# Patient Record
Sex: MALE | Race: OTHER | Employment: UNEMPLOYED | URBAN - METROPOLITAN AREA
[De-identification: names, ages, dates, MRNs, and addresses within clinical notes are randomized per-mention and may not be internally consistent; named-entity substitution may affect disease eponyms.]

---

## 2020-01-30 ENCOUNTER — APPOINTMENT (OUTPATIENT)
Dept: VASCULAR SURGERY | Age: 40
End: 2020-01-30
Attending: EMERGENCY MEDICINE
Payer: SELF-PAY

## 2020-01-30 ENCOUNTER — APPOINTMENT (OUTPATIENT)
Dept: GENERAL RADIOLOGY | Age: 40
End: 2020-01-30
Attending: EMERGENCY MEDICINE
Payer: SELF-PAY

## 2020-01-30 ENCOUNTER — APPOINTMENT (OUTPATIENT)
Dept: CT IMAGING | Age: 40
End: 2020-01-30
Attending: EMERGENCY MEDICINE
Payer: SELF-PAY

## 2020-01-30 ENCOUNTER — HOSPITAL ENCOUNTER (EMERGENCY)
Age: 40
Discharge: HOME | End: 2020-01-30
Attending: EMERGENCY MEDICINE
Payer: SELF-PAY

## 2020-01-30 VITALS
RESPIRATION RATE: 17 BRPM | SYSTOLIC BLOOD PRESSURE: 177 MMHG | HEART RATE: 76 BPM | DIASTOLIC BLOOD PRESSURE: 97 MMHG | TEMPERATURE: 98.9 F | OXYGEN SATURATION: 90 % | HEIGHT: 72 IN

## 2020-01-30 DIAGNOSIS — J11.1 INFLUENZA-LIKE ILLNESS: Primary | ICD-10-CM

## 2020-01-30 DIAGNOSIS — J45.901 REACTIVE AIRWAY DISEASE WITH ACUTE EXACERBATION, UNSPECIFIED ASTHMA SEVERITY, UNSPECIFIED WHETHER PERSISTENT: ICD-10-CM

## 2020-01-30 LAB
ALBUMIN SERPL-MCNC: 3.6 G/DL (ref 3.5–5)
ALBUMIN/GLOB SERPL: 0.8 {RATIO} (ref 1.1–2.2)
ALP SERPL-CCNC: 72 U/L (ref 45–117)
ALT SERPL-CCNC: 31 U/L (ref 12–78)
ANION GAP SERPL CALC-SCNC: 4 MMOL/L (ref 5–15)
AST SERPL-CCNC: 26 U/L (ref 15–37)
ATRIAL RATE: 94 BPM
BASOPHILS # BLD: 0 K/UL (ref 0–0.1)
BASOPHILS NFR BLD: 0 % (ref 0–1)
BILIRUB SERPL-MCNC: 0.5 MG/DL (ref 0.2–1)
BNP SERPL-MCNC: 173 PG/ML
BUN SERPL-MCNC: 11 MG/DL (ref 6–20)
BUN/CREAT SERPL: 16 (ref 12–20)
CALCIUM SERPL-MCNC: 8.9 MG/DL (ref 8.5–10.1)
CALCULATED P AXIS, ECG09: 29 DEGREES
CALCULATED R AXIS, ECG10: 68 DEGREES
CALCULATED T AXIS, ECG11: 26 DEGREES
CHLORIDE SERPL-SCNC: 102 MMOL/L (ref 97–108)
CO2 SERPL-SCNC: 33 MMOL/L (ref 21–32)
COMMENT, HOLDF: NORMAL
CREAT SERPL-MCNC: 0.67 MG/DL (ref 0.7–1.3)
DIAGNOSIS, 93000: NORMAL
DIFFERENTIAL METHOD BLD: ABNORMAL
EOSINOPHIL # BLD: 0.3 K/UL (ref 0–0.4)
EOSINOPHIL NFR BLD: 3 % (ref 0–7)
ERYTHROCYTE [DISTWIDTH] IN BLOOD BY AUTOMATED COUNT: 19.7 % (ref 11.5–14.5)
FLUAV AG NPH QL IA: NEGATIVE
FLUBV AG NOSE QL IA: NEGATIVE
GLOBULIN SER CALC-MCNC: 4.5 G/DL (ref 2–4)
GLUCOSE SERPL-MCNC: 125 MG/DL (ref 65–100)
HCT VFR BLD AUTO: 42.8 % (ref 36.6–50.3)
HGB BLD-MCNC: 13.4 G/DL (ref 12.1–17)
IMM GRANULOCYTES # BLD AUTO: 0 K/UL (ref 0–0.04)
IMM GRANULOCYTES NFR BLD AUTO: 0 % (ref 0–0.5)
LACTATE SERPL-SCNC: 1 MMOL/L (ref 0.4–2)
LYMPHOCYTES # BLD: 2.2 K/UL (ref 0.8–3.5)
LYMPHOCYTES NFR BLD: 24 % (ref 12–49)
MCH RBC QN AUTO: 27.7 PG (ref 26–34)
MCHC RBC AUTO-ENTMCNC: 31.3 G/DL (ref 30–36.5)
MCV RBC AUTO: 88.6 FL (ref 80–99)
MONOCYTES # BLD: 0.8 K/UL (ref 0–1)
MONOCYTES NFR BLD: 9 % (ref 5–13)
NEUTS SEG # BLD: 5.9 K/UL (ref 1.8–8)
NEUTS SEG NFR BLD: 64 % (ref 32–75)
NRBC # BLD: 0 K/UL (ref 0–0.01)
NRBC BLD-RTO: 0 PER 100 WBC
P-R INTERVAL, ECG05: 150 MS
PLATELET # BLD AUTO: 328 K/UL (ref 150–400)
PMV BLD AUTO: 10 FL (ref 8.9–12.9)
POTASSIUM SERPL-SCNC: 3.6 MMOL/L (ref 3.5–5.1)
PROT SERPL-MCNC: 8.1 G/DL (ref 6.4–8.2)
Q-T INTERVAL, ECG07: 362 MS
QRS DURATION, ECG06: 96 MS
QTC CALCULATION (BEZET), ECG08: 452 MS
RBC # BLD AUTO: 4.83 M/UL (ref 4.1–5.7)
SAMPLES BEING HELD,HOLD: NORMAL
SODIUM SERPL-SCNC: 139 MMOL/L (ref 136–145)
TROPONIN I SERPL-MCNC: <0.05 NG/ML
TROPONIN I SERPL-MCNC: <0.05 NG/ML
VENTRICULAR RATE, ECG03: 94 BPM
WBC # BLD AUTO: 9.3 K/UL (ref 4.1–11.1)

## 2020-01-30 PROCEDURE — 71275 CT ANGIOGRAPHY CHEST: CPT

## 2020-01-30 PROCEDURE — 87040 BLOOD CULTURE FOR BACTERIA: CPT

## 2020-01-30 PROCEDURE — 77030013140 HC MSK NEB VYRM -A

## 2020-01-30 PROCEDURE — 74011250637 HC RX REV CODE- 250/637: Performed by: EMERGENCY MEDICINE

## 2020-01-30 PROCEDURE — 99285 EMERGENCY DEPT VISIT HI MDM: CPT

## 2020-01-30 PROCEDURE — 80053 COMPREHEN METABOLIC PANEL: CPT

## 2020-01-30 PROCEDURE — 36415 COLL VENOUS BLD VENIPUNCTURE: CPT

## 2020-01-30 PROCEDURE — 83880 ASSAY OF NATRIURETIC PEPTIDE: CPT

## 2020-01-30 PROCEDURE — 74011636320 HC RX REV CODE- 636/320: Performed by: RADIOLOGY

## 2020-01-30 PROCEDURE — 84484 ASSAY OF TROPONIN QUANT: CPT

## 2020-01-30 PROCEDURE — 93005 ELECTROCARDIOGRAM TRACING: CPT

## 2020-01-30 PROCEDURE — 94640 AIRWAY INHALATION TREATMENT: CPT

## 2020-01-30 PROCEDURE — 83605 ASSAY OF LACTIC ACID: CPT

## 2020-01-30 PROCEDURE — 93970 EXTREMITY STUDY: CPT

## 2020-01-30 PROCEDURE — 85025 COMPLETE CBC W/AUTO DIFF WBC: CPT

## 2020-01-30 PROCEDURE — 74011000250 HC RX REV CODE- 250: Performed by: EMERGENCY MEDICINE

## 2020-01-30 PROCEDURE — 87804 INFLUENZA ASSAY W/OPTIC: CPT

## 2020-01-30 RX ORDER — METFORMIN HYDROCHLORIDE 500 MG/1
500 TABLET ORAL 2 TIMES DAILY WITH MEALS
COMMUNITY
End: 2020-05-12 | Stop reason: SDUPTHER

## 2020-01-30 RX ORDER — AMLODIPINE BESYLATE 5 MG/1
5 TABLET ORAL DAILY
COMMUNITY
End: 2020-08-19 | Stop reason: SDUPTHER

## 2020-01-30 RX ORDER — IBUPROFEN 600 MG/1
600 TABLET ORAL
Status: COMPLETED | OUTPATIENT
Start: 2020-01-30 | End: 2020-01-30

## 2020-01-30 RX ORDER — ALBUTEROL SULFATE 90 UG/1
2 AEROSOL, METERED RESPIRATORY (INHALATION)
Qty: 1 INHALER | Refills: 0 | Status: SHIPPED | OUTPATIENT
Start: 2020-01-30 | End: 2020-05-12 | Stop reason: SDUPTHER

## 2020-01-30 RX ORDER — FUROSEMIDE 20 MG/1
20 TABLET ORAL DAILY
COMMUNITY
End: 2020-03-25 | Stop reason: SDUPTHER

## 2020-01-30 RX ADMIN — IOPAMIDOL 80 ML: 755 INJECTION, SOLUTION INTRAVENOUS at 16:47

## 2020-01-30 RX ADMIN — IBUPROFEN 600 MG: 600 TABLET, FILM COATED ORAL at 16:17

## 2020-01-30 RX ADMIN — ALBUTEROL SULFATE 1 DOSE: 2.5 SOLUTION RESPIRATORY (INHALATION) at 14:37

## 2020-01-30 NOTE — ED PROVIDER NOTES
History is provided by the patient through a family friend who is the . He presents with 4 days of body aches, nonproductive cough, chest pain and feeling hot and cold. He also has some pain behind the left knee and the calf. He most recently was admitted for 40 days at a hospital in [place]. He was kept in ICU. He was told that he has a diagnosis of pneumonia. He was discharged on January 8, 2020 2 days ago. On January 10, he traveled to [place]. He will be settling here permanently. He has 3 pill bottles with him: Amlodipine, Lasix, and metformin. He checked his blood sugar this morning it was 130. He has no primary care physician here. He has no paperwork from his hospitalization in [place]. He has midsternal chest pain that is 5/10. He is prescribed no nebulizers or inhalers. 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 Had open tib/fib fx with hardware placed. Neg abd or chest surgical hx.       nonsmoker    No past medical history on file. Dm, pna, htn    No past surgical history on file. No family history on file.     Social History     Socioeconomic History    Marital status: Not on file     Spouse name: Not on file    Number of children: Not on file    Years of education: Not on file    Highest education level: Not on file   Occupational History    Not on file   Social Needs    Financial resource strain: Not on file    Food insecurity:     Worry: Not on file     Inability: Not on file    Transportation needs:     Medical: Not on file     Non-medical: Not on file   Tobacco Use    Smoking status: Not on file   Substance and Sexual Activity    Alcohol use: Not on file    Drug use: Not on file    Sexual activity: Not on file   Lifestyle    Physical activity:     Days per week: Not on file     Minutes per session: Not on file    Stress: Not on file   Relationships    Social connections:     Talks on phone: Not on file     Gets together: Not on file     Attends Congregation service: Not on file     Active member of club or organization: Not on file     Attends meetings of clubs or organizations: Not on file     Relationship status: Not on file    Intimate partner violence:     Fear of current or ex partner: Not on file     Emotionally abused: Not on file     Physically abused: Not on file     Forced sexual activity: Not on file   Other Topics Concern    Not on file   Social History Narrative    Not on file         ALLERGIES: Patient has no known allergies. Review of Systems   All other systems reviewed and are negative. Vitals:    01/30/20 1406   BP: (!) 153/94   Pulse: 100   Resp: 19   Temp: 98.9 °F (37.2 °C)   SpO2: 93%   Height: 6' (1.829 m)            Physical Exam  Vitals signs and nursing note reviewed. Constitutional:       General: He is not in acute distress. Appearance: Normal appearance. He is diaphoretic (slight). HENT:      Head: Normocephalic and atraumatic. Nose: Nose normal.      Mouth/Throat:      Mouth: Mucous membranes are moist.      Pharynx: Oropharynx is clear. Eyes:      Pupils: Pupils are equal, round, and reactive to light. Cardiovascular:      Rate and Rhythm: Normal rate and regular rhythm. Pulses: Normal pulses. Pulmonary:      Comments: Slightly labored breathing. Scattered wheeze L base. Abdominal:      Palpations: Abdomen is soft. Tenderness: There is no abdominal tenderness. Musculoskeletal:         General: Swelling (trace bilat) present. Comments: Tender L calf, popliteal area    Skin:     General: Skin is warm. Neurological:      General: No focal deficit present. Mental Status: He is alert. Psychiatric:         Mood and Affect: Mood normal.          MDM       Procedures    EKG reviewed by me: Normal sinus rhythm, rate of 94, regular. Normal axis and intervals. No acute ST-T wave changes noted.  Camila Buerger, DO      2:32 PM - will get US of legs, cxr, neb, blood. Will try to obtain records from [address].    3:24 PM - pain a little better. Breathing better after neb x 1. EKG #2 interpreted by me: Normal sinus rhythm, regular, rate of 85. Normal axis and intervals. No acute ST-T wave changes noted. Ken Vinson,         Recent Results (from the past 12 hour(s))   EKG, 12 LEAD, INITIAL    Collection Time: 01/30/20  2:13 PM   Result Value Ref Range    Ventricular Rate 94 BPM    Atrial Rate 94 BPM    P-R Interval 150 ms    QRS Duration 96 ms    Q-T Interval 362 ms    QTC Calculation (Bezet) 452 ms    Calculated P Axis 29 degrees    Calculated R Axis 68 degrees    Calculated T Axis 26 degrees    Diagnosis       Normal sinus rhythm  Normal ECG  No previous ECGs available  Confirmed by Zoya Montemayor MD. ([id number]) on 1/30/2020 5:33:57 PM     CBC WITH AUTOMATED DIFF    Collection Time: 01/30/20  2:15 PM   Result Value Ref Range    WBC 9.3 4.1 - 11.1 K/uL    RBC 4.83 4.10 - 5.70 M/uL    HGB 13.4 12.1 - 17.0 g/dL    HCT 42.8 36.6 - 50.3 %    MCV 88.6 80.0 - 99.0 FL    MCH 27.7 26.0 - 34.0 PG    MCHC 31.3 30.0 - 36.5 g/dL    RDW 19.7 (H) 11.5 - 14.5 %    PLATELET 159 735 - 159 K/uL    MPV 10.0 8.9 - 12.9 FL    NRBC 0.0 0  WBC    ABSOLUTE NRBC 0.00 0.00 - 0.01 K/uL    NEUTROPHILS 64 32 - 75 %    LYMPHOCYTES 24 12 - 49 %    MONOCYTES 9 5 - 13 %    EOSINOPHILS 3 0 - 7 %    BASOPHILS 0 0 - 1 %    IMMATURE GRANULOCYTES 0 0.0 - 0.5 %    ABS. NEUTROPHILS 5.9 1.8 - 8.0 K/UL    ABS. LYMPHOCYTES 2.2 0.8 - 3.5 K/UL    ABS. MONOCYTES 0.8 0.0 - 1.0 K/UL    ABS. EOSINOPHILS 0.3 0.0 - 0.4 K/UL    ABS. BASOPHILS 0.0 0.0 - 0.1 K/UL    ABS. IMM.  GRANS. 0.0 0.00 - 0.04 K/UL    DF AUTOMATED     METABOLIC PANEL, COMPREHENSIVE    Collection Time: 01/30/20  2:15 PM   Result Value Ref Range    Sodium 139 136 - 145 mmol/L    Potassium 3.6 3.5 - 5.1 mmol/L    Chloride 102 97 - 108 mmol/L    CO2 33 (H) 21 - 32 mmol/L    Anion gap 4 (L) 5 - 15 mmol/L    Glucose 125 (H) 65 - 100 mg/dL    BUN 11 6 - 20 MG/DL    Creatinine 0.67 (L) 0.70 - 1.30 MG/DL    BUN/Creatinine ratio 16 12 - 20      GFR est AA >60 >60 ml/min/1.73m2    GFR est non-AA >60 >60 ml/min/1.73m2    Calcium 8.9 8.5 - 10.1 MG/DL    Bilirubin, total 0.5 0.2 - 1.0 MG/DL    ALT (SGPT) 31 12 - 78 U/L    AST (SGOT) 26 15 - 37 U/L    Alk. phosphatase 72 45 - 117 U/L    Protein, total 8.1 6.4 - 8.2 g/dL    Albumin 3.6 3.5 - 5.0 g/dL    Globulin 4.5 (H) 2.0 - 4.0 g/dL    A-G Ratio 0.8 (L) 1.1 - 2.2     TROPONIN I    Collection Time: 01/30/20  2:15 PM   Result Value Ref Range    Troponin-I, Qt. <0.05 <0.05 ng/mL   LACTIC ACID    Collection Time: 01/30/20  2:15 PM   Result Value Ref Range    Lactic acid 1.0 0.4 - 2.0 MMOL/L   SAMPLES BEING HELD    Collection Time: 01/30/20  2:15 PM   Result Value Ref Range    SAMPLES BEING HELD BLUE     COMMENT        Add-on orders for these samples will be processed based on acceptable specimen integrity and analyte stability, which may vary by analyte. NT-PRO BNP    Collection Time: 01/30/20  2:15 PM   Result Value Ref Range    NT pro- (H) <125 PG/ML   INFLUENZA A & B AG (RAPID TEST)    Collection Time: 01/30/20  2:39 PM   Result Value Ref Range    Influenza A Antigen NEGATIVE  NEG      Influenza B Antigen NEGATIVE  NEG     EKG, 12 LEAD, SUBSEQUENT    Collection Time: 01/30/20  4:24 PM   Result Value Ref Range    Ventricular Rate 85 BPM    Atrial Rate 85 BPM    P-R Interval 156 ms    QRS Duration 96 ms    Q-T Interval 398 ms    QTC Calculation (Bezet) 473 ms    Calculated P Axis 28 degrees    Calculated R Axis 63 degrees    Calculated T Axis 33 degrees    Diagnosis       Normal sinus rhythm  Normal ECG  When compared with ECG of 30-JAN-2020 14:13,  MANUAL COMPARISON REQUIRED, DATA IS UNCONFIRMED     TROPONIN I    Collection Time: 01/30/20  4:30 PM   Result Value Ref Range    Troponin-I, Qt. <0.05 <0.05 ng/mL     Ct images reviewed    Likely viral respiratory infection.   He felt a little better with albuterol and Atrovent here. Wrote a prescription for an albuterol inhaler. I do think he needs steroids at this time. He was told not to take his Glucophage for 48 hours. Discussed with family friend who was translating. D/w Dr Figueroa who has offered to see the pt in his clinic to help him establish care.

## 2020-01-30 NOTE — ED TRIAGE NOTES
Pt having chest pain, hot and bone pain. Swelling in the legs. SOB and headache. Family friends at bedside translating at this time.

## 2020-01-30 NOTE — DISCHARGE INSTRUCTIONS
Patient Education     No metformin for 48 hours. Upper Respiratory Infection: After Your Visit to the Emergency Room  Your Care Instructions  You were seen in the emergency room for an upper respiratory infection (URI). This is an infection of the nose, sinuses, or throat. Viruses or bacteria can cause URIs. Colds, flu, and sinusitis are examples of URIs. These infections are spread by coughs, sneezes, and close contact with people who have a URI. Your doctor may have given you antibiotics to treat the infection if it was caused by bacteria. But antibiotics will not help a viral infection. You can treat most infections with home care. This may include drinking lots of fluids and taking over-the-counter medicine for your symptoms. Even though you have been released from the emergency room, you still need to watch for any problems. The doctor carefully checked you. But sometimes problems can develop later. If you have new symptoms, or if your symptoms do not get better, return to the emergency room or call your doctor right away. A visit to the emergency room is only one step in your treatment. Even if you feel better, you still need to do what your doctor recommends, such as going to all suggested follow-up appointments and taking medicines exactly as directed. This will help you recover and help prevent future problems. How can you care for yourself at home? · To prevent dehydration, drink plenty of fluids, enough so that your urine is light yellow or clear like water. Choose water and other caffeine-free clear liquids until you feel better. If you have kidney, heart, or liver disease and have to limit fluids, talk with your doctor before you increase the amount of fluids you drink. · Take acetaminophen (Tylenol) or ibuprofen (Advil, Motrin) for fever or pain. Read and follow all instructions on the label. · If your doctor prescribed antibiotics, take them as directed.  Do not stop taking them just because you feel better. You need to take the full course of antibiotics. · Take cough medicine and a decongestant if your doctor suggests it. · Get plenty of rest.  · Use saline (saltwater) nasal washes to help keep your nasal passages open and wash out mucus and bacteria. You can buy saline nose drops at a grocery store or drugstore. Or you can make your own at home by adding 1 teaspoon of salt and 1 teaspoon of baking soda to 2 cups of distilled water. If you make your own, fill a bulb syringe with the solution, insert the tip into your nostril, and squeeze gently. Adryan Rolls your nose. · Use a vaporizer or humidifier to add moisture to the air in your bedroom. Follow the instructions for cleaning it. · Do not smoke or allow others to smoke around you. If you need help quitting, talk to your doctor about stop-smoking programs and medicines. These can increase your chances of quitting for good. When should you call for help? Call 911 if:  · You have severe trouble breathing. Return to the emergency room now if:  · You have a fever with stiff neck or a severe headache. · You have signs of needing more fluids. You have sunken eyes, a dry mouth, and pass only a little dark urine. · You cannot keep down fluids or medicine. Call your doctor today if:  · You have a deep cough and a lot of mucus. · You are too tired to eat or drink. · You have a new symptom, such as a sore throat, an earache, or a rash. Where can you learn more? Go to CallFire.be  Enter N493 in the search box to learn more about \"Upper Respiratory Infection: After Your Visit to the Emergency Room. \"   © 6569-6809 Healthwise, Incorporated. Care instructions adapted under license by [facility] [employer] (which disclaims liability or warranty for this information).  This care instruction is for use with your licensed healthcare professional. If you have questions about a medical condition or this instruction, always ask your healthcare professional. [place] [address] any warranty or liability for your use of this information.   Content Version: 9.3.32772; Last Revised: February 13, 2012

## 2020-01-30 NOTE — ED NOTES
Information given to  along with release form to get medical records from [facility] in [place]

## 2020-01-31 LAB
ATRIAL RATE: 85 BPM
CALCULATED P AXIS, ECG09: 28 DEGREES
CALCULATED R AXIS, ECG10: 63 DEGREES
CALCULATED T AXIS, ECG11: 33 DEGREES
DIAGNOSIS, 93000: NORMAL
P-R INTERVAL, ECG05: 156 MS
Q-T INTERVAL, ECG07: 398 MS
QRS DURATION, ECG06: 96 MS
QTC CALCULATION (BEZET), ECG08: 473 MS
VENTRICULAR RATE, ECG03: 85 BPM

## 2020-02-05 LAB
BACTERIA SPEC CULT: NORMAL
SERVICE CMNT-IMP: NORMAL

## 2020-02-06 ENCOUNTER — TELEPHONE (OUTPATIENT)
Dept: FAMILY MEDICINE CLINIC | Age: 40
End: 2020-02-06

## 2020-02-06 NOTE — TELEPHONE ENCOUNTER
[address]   call line [phone] that he was in [address] [place] [address] patient was upset because he call around  6:59am to get an apt and no more spaces  Were available . Patient stated that hospital told him to call back to make a follow up  Apt .       Thank you   Kera Mtz

## 2020-02-10 ENCOUNTER — OFFICE VISIT (OUTPATIENT)
Dept: FAMILY MEDICINE CLINIC | Age: 40
End: 2020-02-10

## 2020-02-10 ENCOUNTER — HOSPITAL ENCOUNTER (OUTPATIENT)
Dept: LAB | Age: 40
Discharge: HOME | End: 2020-02-10

## 2020-02-10 VITALS
TEMPERATURE: 98.4 F | WEIGHT: 315 LBS | DIASTOLIC BLOOD PRESSURE: 85 MMHG | OXYGEN SATURATION: 96 % | HEART RATE: 90 BPM | BODY MASS INDEX: 48.55 KG/M2 | SYSTOLIC BLOOD PRESSURE: 138 MMHG

## 2020-02-10 DIAGNOSIS — I51.7 CARDIOMEGALY: ICD-10-CM

## 2020-02-10 DIAGNOSIS — R73.9 ELEVATED BLOOD SUGAR: ICD-10-CM

## 2020-02-10 DIAGNOSIS — R60.9 EDEMA, UNSPECIFIED TYPE: ICD-10-CM

## 2020-02-10 DIAGNOSIS — I50.9 ACUTE ON CHRONIC CONGESTIVE HEART FAILURE, UNSPECIFIED HEART FAILURE TYPE (HCC): Primary | ICD-10-CM

## 2020-02-10 DIAGNOSIS — I50.9 ACUTE ON CHRONIC CONGESTIVE HEART FAILURE, UNSPECIFIED HEART FAILURE TYPE (HCC): ICD-10-CM

## 2020-02-10 LAB
ALBUMIN SERPL-MCNC: 4.2 G/DL (ref 3.5–5)
ALBUMIN/GLOB SERPL: 1.1 {RATIO} (ref 1.1–2.2)
ALP SERPL-CCNC: 74 U/L (ref 45–117)
ALT SERPL-CCNC: 32 U/L (ref 12–78)
ANION GAP SERPL CALC-SCNC: 6 MMOL/L (ref 5–15)
AST SERPL-CCNC: 24 U/L (ref 15–37)
BASOPHILS # BLD: 0.1 K/UL (ref 0–0.1)
BASOPHILS NFR BLD: 1 % (ref 0–1)
BILIRUB SERPL-MCNC: 0.6 MG/DL (ref 0.2–1)
BUN SERPL-MCNC: 12 MG/DL (ref 6–20)
BUN/CREAT SERPL: 18 (ref 12–20)
CALCIUM SERPL-MCNC: 9.7 MG/DL (ref 8.5–10.1)
CHLORIDE SERPL-SCNC: 102 MMOL/L (ref 97–108)
CHOLEST SERPL-MCNC: 208 MG/DL
CO2 SERPL-SCNC: 31 MMOL/L (ref 21–32)
CREAT SERPL-MCNC: 0.65 MG/DL (ref 0.7–1.3)
DIFFERENTIAL METHOD BLD: ABNORMAL
EOSINOPHIL # BLD: 0.3 K/UL (ref 0–0.4)
EOSINOPHIL NFR BLD: 4 % (ref 0–7)
ERYTHROCYTE [DISTWIDTH] IN BLOOD BY AUTOMATED COUNT: 18.6 % (ref 11.5–14.5)
EST. AVERAGE GLUCOSE BLD GHB EST-MCNC: 117 MG/DL
GLOBULIN SER CALC-MCNC: 4 G/DL (ref 2–4)
GLUCOSE POC: NORMAL MG/DL
GLUCOSE SERPL-MCNC: 86 MG/DL (ref 65–100)
HBA1C MFR BLD: 5.7 % (ref 4–5.6)
HCT VFR BLD AUTO: 49.6 % (ref 36.6–50.3)
HDLC SERPL-MCNC: 60 MG/DL
HDLC SERPL: 3.5 {RATIO} (ref 0–5)
HGB BLD-MCNC: 15.3 G/DL (ref 12.1–17)
IMM GRANULOCYTES # BLD AUTO: 0 K/UL (ref 0–0.04)
IMM GRANULOCYTES NFR BLD AUTO: 0 % (ref 0–0.5)
LDLC SERPL CALC-MCNC: 124.2 MG/DL (ref 0–100)
LIPID PROFILE,FLP: ABNORMAL
LYMPHOCYTES # BLD: 1.9 K/UL (ref 0.8–3.5)
LYMPHOCYTES NFR BLD: 26 % (ref 12–49)
MCH RBC QN AUTO: 28.1 PG (ref 26–34)
MCHC RBC AUTO-ENTMCNC: 30.8 G/DL (ref 30–36.5)
MCV RBC AUTO: 91 FL (ref 80–99)
MONOCYTES # BLD: 0.6 K/UL (ref 0–1)
MONOCYTES NFR BLD: 9 % (ref 5–13)
NEUTS SEG # BLD: 4.5 K/UL (ref 1.8–8)
NEUTS SEG NFR BLD: 60 % (ref 32–75)
NRBC # BLD: 0 K/UL (ref 0–0.01)
NRBC BLD-RTO: 0 PER 100 WBC
PLATELET # BLD AUTO: 319 K/UL (ref 150–400)
PMV BLD AUTO: 11 FL (ref 8.9–12.9)
POTASSIUM SERPL-SCNC: 3.6 MMOL/L (ref 3.5–5.1)
PROT SERPL-MCNC: 8.2 G/DL (ref 6.4–8.2)
RBC # BLD AUTO: 5.45 M/UL (ref 4.1–5.7)
SODIUM SERPL-SCNC: 139 MMOL/L (ref 136–145)
TRIGL SERPL-MCNC: 119 MG/DL (ref ?–150)
VLDLC SERPL CALC-MCNC: 23.8 MG/DL
WBC # BLD AUTO: 7.4 K/UL (ref 4.1–11.1)

## 2020-02-10 PROCEDURE — 80061 LIPID PANEL: CPT

## 2020-02-10 PROCEDURE — 80053 COMPREHEN METABOLIC PANEL: CPT

## 2020-02-10 PROCEDURE — 85025 COMPLETE CBC W/AUTO DIFF WBC: CPT

## 2020-02-10 PROCEDURE — 83036 HEMOGLOBIN GLYCOSYLATED A1C: CPT

## 2020-02-10 RX ORDER — LISINOPRIL 5 MG/1
5 TABLET ORAL DAILY
Qty: 30 TAB | Refills: 5 | Status: SHIPPED | OUTPATIENT
Start: 2020-02-10 | End: 2020-05-18

## 2020-02-10 RX ORDER — METFORMIN HYDROCHLORIDE 500 MG/1
500 TABLET ORAL 2 TIMES DAILY WITH MEALS
Qty: 60 TAB | Refills: 5 | Status: SHIPPED | OUTPATIENT
Start: 2020-02-10 | End: 2020-03-18 | Stop reason: SDUPTHER

## 2020-02-10 RX ORDER — CARVEDILOL 3.12 MG/1
3.12 TABLET ORAL 2 TIMES DAILY WITH MEALS
Qty: 60 TAB | Refills: 5 | Status: SHIPPED | OUTPATIENT
Start: 2020-02-10 | End: 2020-08-19 | Stop reason: SDUPTHER

## 2020-02-10 RX ORDER — FUROSEMIDE 20 MG/1
TABLET ORAL
Qty: 60 TAB | Refills: 5 | Status: SHIPPED | OUTPATIENT
Start: 2020-02-10 | End: 2020-03-18

## 2020-02-10 NOTE — PROGRESS NOTES
Results for orders placed or performed in visit on 02/10/20   AMB POC GLUCOSE BLOOD, BY GLUCOSE MONITORING DEVICE   Result Value Ref Range    Glucose POC nf 110 mg/dL

## 2020-02-10 NOTE — PROGRESS NOTES
HISTORY  [facility] A Stephon Newell is a 42 y.o. male. HPI  Patient states he has been short of breath at nights. States he was having fever and had bronchitis, states this happened in November. Then He was in the hospital for 2 months. He was told he had developed congestive heart failure. He stayed in the hospital he was very sick. He was told to get a pcp. Right now he wakes up at night with shortness of breath and headache. He feels he is gasping for air at night. Also having bone pains. Patient states he has noticed he has gained some weight. Needs 4-5 pillows at night to sleep. Has leg swelling. Review of Systems   HENT: Negative for ear pain, hearing loss and tinnitus. Eyes: Negative for blurred vision, double vision, photophobia and pain. Respiratory: Positive for shortness of breath. Cardiovascular: Positive for orthopnea, leg swelling and PND. Gastrointestinal: Negative for abdominal pain, heartburn, nausea and vomiting. Genitourinary: Negative for dysuria, frequency, hematuria and urgency. Musculoskeletal: Negative for back pain, myalgias and neck pain. Neurological: Positive for headaches. /85 (BP 1 Location: Right arm, BP Patient Position: Sitting)   Pulse 90   Temp 98.4 °F (36.9 °C) (Temporal)   Wt (!) 358 lb (162.4 kg)   SpO2 96%   BMI 48.55 kg/m²   Physical Exam  Constitutional:       Appearance: Normal appearance. HENT:      Head: Normocephalic and atraumatic. Nose: Nose normal. No congestion. Mouth/Throat:      Mouth: Mucous membranes are moist.      Pharynx: No oropharyngeal exudate or posterior oropharyngeal erythema. Neck:      Musculoskeletal: Normal range of motion. Cardiovascular:      Rate and Rhythm: Tachycardia present. Heart sounds: No murmur. No gallop. Pulmonary:      Effort: Pulmonary effort is normal. No respiratory distress. Breath sounds: No wheezing or rales.    Musculoskeletal: Normal range of motion. General: No swelling or tenderness. Right lower leg: Edema present. Left lower leg: Edema present. Neurological:      Mental Status: He is alert. ASSESSMENT and PLAN  Diagnoses and all orders for this visit:    1. Acute on chronic congestive heart failure, unspecified heart failure type (HCC)  -     METABOLIC PANEL, COMPREHENSIVE; Future  -     CBC WITH AUTOMATED DIFF; Future  -     LIPID PANEL; Future  -     carvediloL (COREG) 3.125 mg tablet; Take 1 Tab by mouth two (2) times daily (with meals). -     lisinopril (PRINIVIL, ZESTRIL) 5 mg tablet; Take 1 Tab by mouth daily. -     furosemide (LASIX) 20 mg tablet; One tablet PO once a day  -     REFERRAL TO CARDIOLOGY    2. Cardiomegaly  -     REFERRAL TO CARDIOLOGY    3. Edema, unspecified type  -     REFERRAL TO CARDIOLOGY    4. Elevated blood sugar  -     AMB POC GLUCOSE BLOOD, BY GLUCOSE MONITORING DEVICE  -     HEMOGLOBIN A1C WITH EAG; Future  -     metFORMIN (GLUCOPHAGE) 500 mg tablet; Take 1 Tab by mouth two (2) times daily (with meals).       Congestive Heart Failure,  Will refer to cardiology for evaluation and management  Check labs today  We will meet again in 2 weeks

## 2020-02-10 NOTE — PROGRESS NOTES
At discharge station AVS was printed and reviewed with pt with Kim Jeffers as . Reviewed rx script for all meds and told pt rx should be ready for  at pharmacy in approximately 2 hrs. Pt taken to registration to make return appointments as directed by provider today.   Claudine Mckeon RN

## 2020-02-12 NOTE — PROGRESS NOTES
Normal kidney function, liver function, electrolytes, glucose levels, blood count.   Cholesterol levels are a little elevated  Hemoglobin a1c is 5.7% which indicates risk of diabetes  Patient can be informed

## 2020-02-24 ENCOUNTER — OFFICE VISIT (OUTPATIENT)
Dept: FAMILY MEDICINE CLINIC | Age: 40
End: 2020-02-24

## 2020-02-24 VITALS
TEMPERATURE: 98.2 F | BODY MASS INDEX: 42.66 KG/M2 | SYSTOLIC BLOOD PRESSURE: 133 MMHG | HEIGHT: 72 IN | WEIGHT: 315 LBS | DIASTOLIC BLOOD PRESSURE: 89 MMHG | HEART RATE: 84 BPM | OXYGEN SATURATION: 94 %

## 2020-02-24 DIAGNOSIS — I51.7 CARDIOMEGALY: ICD-10-CM

## 2020-02-24 DIAGNOSIS — G47.33 OBSTRUCTIVE SLEEP APNEA SYNDROME: ICD-10-CM

## 2020-02-24 DIAGNOSIS — E11.9 TYPE 2 DIABETES MELLITUS WITHOUT COMPLICATION, WITHOUT LONG-TERM CURRENT USE OF INSULIN (HCC): ICD-10-CM

## 2020-02-24 DIAGNOSIS — Z71.89 COUNSELING AND COORDINATION OF CARE: Primary | ICD-10-CM

## 2020-02-24 DIAGNOSIS — K42.9 UMBILICAL HERNIA WITHOUT OBSTRUCTION AND WITHOUT GANGRENE: ICD-10-CM

## 2020-02-24 DIAGNOSIS — E66.01 OBESITY, MORBID (HCC): Primary | ICD-10-CM

## 2020-02-24 DIAGNOSIS — Z23 ENCOUNTER FOR IMMUNIZATION: ICD-10-CM

## 2020-02-24 LAB — GLUCOSE POC: NORMAL MG/DL

## 2020-02-24 NOTE — PROGRESS NOTES
Patient met with SIDNEY MCMILLAN and it was noted that patient is not eligible for Access Now at this time. Patient had recently moved from [place] to this county. Provider was made aware and per provider referral for cardiology, surgery and sleep medicine will be placed on hold for 5 months until patient is eligible, in the meantime patient to meet with provider for a follow up appointment in 2 months as scheduled. .   AVS printed, given and reviewed. This has been fully explained to the patient, who indicates understanding and agrees with plan. No further questions at this time. Holland Prater RN   Immunization given per provider order without compliations following policy and protocol. Please see scanned consent form. VIS given. No contraindications to vaccines. [facility]. Instructions for adverse reactions discussed. Explained that if symptoms (rash, hives SOB, temperature higher of 102'f) to go to the nearest ER. Patient instructed to wait in the clinic for 15 minutes  to observe for any signs of immediate reaction. Told to tell a nurse immediately if reaction occur; if no change and felt well, it was OK to leave after 15 min. Patient expressed understanding. No adverse reaction noted at time of discharge from vaccine area.

## 2020-02-24 NOTE — PROGRESS NOTES
Results for orders placed or performed in visit on 02/24/20   AMB POC GLUCOSE BLOOD, BY GLUCOSE MONITORING DEVICE   Result Value Ref Range    Glucose POC 100nf mg/dL

## 2020-02-24 NOTE — PROGRESS NOTES
[address] FLAQUITA Bird is a 47 y.o. male. HPI  Patient is here for follow up. Patient states he feels a little better. At night he wakes up with some headache. He has been told he snores and has apnea episodes. He states in the hospital he was told he needed cpap. He was using one at the hospital but was discharged without one. Review of Systems   HENT: Negative for ear pain, hearing loss and tinnitus. Eyes: Negative for blurred vision, double vision, photophobia and pain. Respiratory: Positive for shortness of breath. Cardiovascular: Positive for orthopnea and PND. Negative for leg swelling. Gastrointestinal: Negative for abdominal pain, heartburn, nausea and vomiting. Genitourinary: Negative for dysuria, frequency, hematuria and urgency. Musculoskeletal: Negative for back pain, myalgias and neck pain. Neurological: Positive for headaches. /89 (BP 1 Location: Right arm, BP Patient Position: Sitting)   Pulse 84   Temp 98.2 °F (36.8 °C) (Oral)   Ht 6' 0.01\" (1.829 m)   Wt (!) 357 lb 6.4 oz (162.1 kg)   SpO2 94%   BMI 48.46 kg/m²   Physical Exam  Constitutional:       Appearance: Normal appearance. HENT:      Head: Normocephalic and atraumatic. Nose: Nose normal. No congestion. Mouth/Throat:      Mouth: Mucous membranes are moist.      Pharynx: No oropharyngeal exudate or posterior oropharyngeal erythema. Neck:      Musculoskeletal: Normal range of motion. Cardiovascular:      Rate and Rhythm: Regular rhythm. Pulses: Normal pulses. Heart sounds: No murmur. No gallop. Pulmonary:      Effort: Pulmonary effort is normal. No respiratory distress. Breath sounds: No wheezing or rales. Musculoskeletal: Normal range of motion. General: No swelling or tenderness. Right lower leg: No edema. Left lower leg: No edema. Neurological:      Mental Status: He is alert.          ASSESSMENT and PLAN  Diagnoses and all orders for this visit:    1. Obesity, morbid ([address]    2. Obstructive sleep apnea syndrome  -     SLEEP MEDICINE REFERRAL    3. Umbilical hernia without obstruction and without gangrene  -     REFERRAL TO SURGERY    4. Cardiomegaly    5. Type 2 diabetes mellitus without complication, without long-term current use of insulin (HCC)  -     AMB POC GLUCOSE BLOOD, BY GLUCOSE MONITORING DEVICE      We will refer to sleep medicine for OC evaluation.   Umbilical hernia,  Will ask urology for evaluation    Medical records from [place] show he was admitted with Anasarca,  Image studies show cardiomegaly,  He is better now but needs to be evaluated by cardiology  Follow up in 2 months

## 2020-02-24 NOTE — PROGRESS NOTES
Patient came to site for AN financial screening. Patient did not qualify because he has only been living in [place] one month. Nurse in charge [place] was notified.

## 2020-03-18 ENCOUNTER — OFFICE VISIT (OUTPATIENT)
Dept: FAMILY MEDICINE CLINIC | Age: 40
End: 2020-03-18

## 2020-03-18 DIAGNOSIS — I50.9 ACUTE ON CHRONIC CONGESTIVE HEART FAILURE, UNSPECIFIED HEART FAILURE TYPE (HCC): Primary | ICD-10-CM

## 2020-03-18 NOTE — PROGRESS NOTES
[address] [place] [address] FLAQUITA Thurston is a 47 y.o. male. HPI  Patient states he is waking up frequently at night due to chest tightness, headaches and shortness of breath. Can't sleep, feels like this happens every 15 minutes. He is using at least 5 pillows to be able to sleep. Patient does not have a scale with him right now. He went to the hospital with a nephew and he is still in the 350s last week, before feeling this way. Denies swelling of his legs      Review of Systems   HENT: Negative for ear pain, hearing loss and tinnitus. Eyes: Negative for blurred vision, double vision, photophobia and pain. Respiratory: Positive for shortness of breath. Cardiovascular: Positive for orthopnea and PND. Negative for leg swelling. Gastrointestinal: Negative for abdominal pain, heartburn, nausea and vomiting. Genitourinary: Negative for dysuria, frequency, hematuria and urgency. Musculoskeletal: Negative for back pain, myalgias and neck pain. Neurological: Positive for headaches. Physical Exam  Neurological:      Mental Status: He is alert. ASSESSMENT and PLAN  Diagnoses and all orders for this visit:    1.  Acute on chronic congestive heart failure, unspecified heart failure type (HCC)          Lasix increased to 40 mg twice a day  Fluid restriction and salt restriction discussed  If symptoms worsen, will need to go to the ED  We will arrange for follow up phone call in 1 week

## 2020-03-18 NOTE — PROGRESS NOTES
Avs discussed with Anette Churchill by Discharge Nurse Olinda Cruz LPN. Discussed with pt changes with his medication. Went over fluid and salt restrictions with pt. Patient verbalized understanding and has no further questions.  AVS printed and given to patient Olinda Cruz LPN

## 2020-03-25 ENCOUNTER — OFFICE VISIT (OUTPATIENT)
Dept: FAMILY MEDICINE CLINIC | Age: 40
End: 2020-03-25

## 2020-03-25 DIAGNOSIS — I50.9 ACUTE ON CHRONIC CONGESTIVE HEART FAILURE, UNSPECIFIED HEART FAILURE TYPE (HCC): Primary | ICD-10-CM

## 2020-03-25 RX ORDER — FUROSEMIDE 20 MG/1
20 TABLET ORAL 2 TIMES DAILY
Qty: 180 TAB | Refills: 2 | Status: SHIPPED | OUTPATIENT
Start: 2020-03-25 | End: 2020-08-19 | Stop reason: SDUPTHER

## 2020-03-25 NOTE — PROGRESS NOTES
Coordination of Care  1. Have you been to the ER, urgent care clinic since your last visit? Hospitalized since your last visit? Yes To [facility] for SOB on 1/30/20    2. Have you seen or consulted any other health care providers outside of the [address] [place] [address] since your last visit? Include any pap smears or colon screening. No    Does the patient need refills? YES    Learning Assessment Complete? yes     Reviewed AVS with pt today over the telephone. Reviewed all medications and gave Good RX coupons when applicable through texting coupon to pt's phone with pt's permission. Reviewed plan of care with pt. For this phone call I used an  : [place]. I sent a message to registrar to move April appointment to 7 weeks out from today.  Isaura Carrasquillo RN

## 2020-03-25 NOTE — PROGRESS NOTES
[address] [place] [address] FLAQUITA Varma is a 47 y.o. male. HPI  Patient agreed to telemedicine visit. Communication was done via phone. Patient states he increased the lasix and the shortness of breath has improved. He is feeling better. Has also adjusted the salt intake as well. No other concerns. ROS    Physical Exam    ASSESSMENT and PLAN  Diagnoses and all orders for this visit:    1. Acute on chronic congestive heart failure, unspecified heart failure type (HCC)  -     furosemide (LASIX) 20 mg tablet; Take 1 Tab by mouth two (2) times a day.       Continue salt restriction  We will arrange for a follow up appointment once the care flaquita Mustafa opens again

## 2020-05-07 DIAGNOSIS — I50.9 ACUTE ON CHRONIC CONGESTIVE HEART FAILURE, UNSPECIFIED HEART FAILURE TYPE (HCC): ICD-10-CM

## 2020-05-07 NOTE — TELEPHONE ENCOUNTER
Lito Rudd 67  Call main office  5/7/2020 requesting medicine patient  Stated that he is out of medicine and that provider told him to call main office when he is out of medicine.     Thank you   Domo Sharma

## 2020-05-07 NOTE — TELEPHONE ENCOUNTER
Of note: medication appears to have been recently sent to pharmacy of this patient. Returning pt call re: medication question, tel call to listed home # [phone], no answer, left vm. 2nd phone call placed to listed mobile # no answer left vm to call an main office back, # given.  Eldon Pagan RN

## 2020-05-12 RX ORDER — METFORMIN HYDROCHLORIDE 500 MG/1
500 TABLET ORAL 2 TIMES DAILY WITH MEALS
Qty: 180 TAB | Refills: 0 | Status: SHIPPED | OUTPATIENT
Start: 2020-05-12 | End: 2020-08-19 | Stop reason: SDUPTHER

## 2020-05-12 RX ORDER — ALBUTEROL SULFATE 90 UG/1
2 AEROSOL, METERED RESPIRATORY (INHALATION)
Qty: 1 INHALER | Refills: 0 | Status: SHIPPED | OUTPATIENT
Start: 2020-05-12 | End: 2020-08-19 | Stop reason: SDUPTHER

## 2020-05-12 NOTE — TELEPHONE ENCOUNTER
Chart reviewed. Nurse telephoned and was able to speak with patient. Patient states that he needs a refill for metformin and is completely out of the Ventolin HFA. Patient has an appt. With Dr. Chay Treadwell on 5/18/2020. Routing medication refill request to provider on call to send rx for Ventolin HFA. No goodrx coupons available.

## 2020-05-16 DIAGNOSIS — I50.9 ACUTE ON CHRONIC CONGESTIVE HEART FAILURE, UNSPECIFIED HEART FAILURE TYPE (HCC): ICD-10-CM

## 2020-05-18 ENCOUNTER — OFFICE VISIT (OUTPATIENT)
Dept: FAMILY MEDICINE CLINIC | Age: 40
End: 2020-05-18

## 2020-05-18 DIAGNOSIS — I50.9 CHRONIC CONGESTIVE HEART FAILURE, UNSPECIFIED HEART FAILURE TYPE (HCC): Primary | ICD-10-CM

## 2020-05-18 RX ORDER — LISINOPRIL 5 MG/1
TABLET ORAL
Qty: 90 TAB | Refills: 0 | Status: SHIPPED | OUTPATIENT
Start: 2020-05-18 | End: 2020-05-18 | Stop reason: SDUPTHER

## 2020-05-18 RX ORDER — LISINOPRIL 5 MG/1
TABLET ORAL
Qty: 90 TAB | Refills: 3 | Status: SHIPPED | OUTPATIENT
Start: 2020-05-18 | End: 2020-07-15 | Stop reason: SDUPTHER

## 2020-07-15 ENCOUNTER — OFFICE VISIT (OUTPATIENT)
Dept: FAMILY MEDICINE CLINIC | Age: 40
End: 2020-07-15

## 2020-07-15 DIAGNOSIS — G47.33 OBSTRUCTIVE SLEEP APNEA SYNDROME: Primary | ICD-10-CM

## 2020-07-15 DIAGNOSIS — I50.9 CHRONIC CONGESTIVE HEART FAILURE, UNSPECIFIED HEART FAILURE TYPE (HCC): ICD-10-CM

## 2020-07-15 RX ORDER — LISINOPRIL 5 MG/1
TABLET ORAL
Qty: 90 TAB | Refills: 3 | Status: SHIPPED | OUTPATIENT
Start: 2020-07-15 | End: 2020-08-19 | Stop reason: SDUPTHER

## 2020-07-15 NOTE — PROGRESS NOTES
[address] [place] [address] Drive A Molly Pump is a 47 y.o. male. HPI  I was at home while conducting this encounter. Consent:  He and/or his healthcare decision maker is aware that this patient-initiated Telehealth encounter is a billable service, with coverage as determined by his insurance carrier. He is aware that he may receive a bill and has provided verbal consent to proceed: Yes    This virtual visit was conducted telephone encounter only. -  I affirm this is a Patient Initiated Episode with an Established Patient who has not had a related appointment within my department in the past 7 days or scheduled within the next 24 hours. Note: this encounter is not billable if this call serves to triage the patient into an appointment for the relevant concern. Total Time: minutes: 11-20 minutes. Patient states he is doing well,  He is trying to control his blood pressure. What has happened is that he has not been able to sleep well. He gets very short of breath and every hour he wakes up because of it. He wakes up with the eyes swollen and short of breath. He has been told he needed a machine to sleep urgently. Patient needs medication refills on lisinopril. ROS    Physical Exam    ASSESSMENT and PLAN  Diagnoses and all orders for this visit:    1. Obstructive sleep apnea syndrome  -     SLEEP MEDICINE REFERRAL    2. Chronic congestive heart failure, unspecified heart failure type (HCC)  -     lisinopriL (PRINIVIL, ZESTRIL) 5 mg tablet;  Take 1 tablet by mouth once daily      Severe sleep apnea, we will refer to sleep medicine for evaluation and management  Follow up in person in 3 weeks

## 2020-07-15 NOTE — PROGRESS NOTES
Coordination of Care  1. Have you been to the ER, urgent care clinic since your last visit? Hospitalized since your last visit? No    2. Have you seen or consulted any other health care providers outside of the [address] [place] [address] since your last visit? Include any pap smears or colon screening. No    Does the patient need refills? YES    Learning Assessment Complete? yes  Depression Screening complete in the past 12 months? yes  Per patient no scale, thermometer, BP machine available at home to check vitals.

## 2020-07-21 ENCOUNTER — OFFICE VISIT (OUTPATIENT)
Dept: FAMILY MEDICINE CLINIC | Age: 40
End: 2020-07-21

## 2020-07-21 ENCOUNTER — PATIENT OUTREACH (OUTPATIENT)
Dept: FAMILY MEDICINE CLINIC | Age: 40
End: 2020-07-21

## 2020-07-21 DIAGNOSIS — Z71.89 COUNSELING AND COORDINATION OF CARE: Primary | ICD-10-CM

## 2020-07-21 NOTE — PROGRESS NOTES
OW called pt for VV. Could not speak with pt left voice messages in both phones [phone] and [phone], asking to call OW back. Pt called back OW in the afternoon. OW assisted him with ANow application. OW explained the process and mailed forms to pt, with written instructions in [employer]. Pending SL and forms that need to be signed.

## 2020-07-28 ENCOUNTER — OFFICE VISIT (OUTPATIENT)
Dept: FAMILY MEDICINE CLINIC | Age: 40
End: 2020-07-28

## 2020-07-28 DIAGNOSIS — Z71.89 COUNSELING AND COORDINATION OF CARE: Primary | ICD-10-CM

## 2020-07-29 ENCOUNTER — PATIENT OUTREACH (OUTPATIENT)
Dept: FAMILY MEDICINE CLINIC | Age: 40
End: 2020-07-29

## 2020-07-29 NOTE — PROGRESS NOTES
7/29/20    [facility]   Chronic Disease Management Nurse Navigator Note    Diagnosis: Congestive Heart Failure, Diabetes Mellitus    Adherence - PCP: YES  Adherence - Medications: compliant with all meds  Barriers: None    ED visit with reachout: NO  If ED visit with reachout, plan: na  If ED visit without reachout, details: na    CHF  Daily weights: three times weekly. Sodium restriction: Yes  Shortness of breath: mild  Edema: mild  Activity Intolerance: tolerable    Weight today is 375, Sunday 380. SOB noted, activity intolerance noted, NN suggested a shower chair and using feet in a pedaling motion when siting in a chair. Call NN if SOB worsens. NN will send a message to PCP for possible lasix adjustment.      DM  Neuropathic pain: yes  Vision disturbance: no  Polyuria:no  Polydipsia: no  24 hour dietary recall:  TBD      Time in discussion: 45 minutes

## 2020-08-05 ENCOUNTER — PATIENT OUTREACH (OUTPATIENT)
Dept: FAMILY MEDICINE CLINIC | Age: 40
End: 2020-08-05

## 2020-08-06 NOTE — PROGRESS NOTES
8/6/20  Goals      Patient verbalizes understanding of self-management goals of living with Congestive Heart Failure      8/6/20  [facility] Luther Gentry is complaining of generalized pain, weight 385 lbs. He needs a face to face appt with Dr Geni Sung. Denies fever, cough. FU in 1 week. Im    7/29/20  Weigh daily, avoid high sodium foods, ie: processed foods, can foods, MSG. NN notes SOB on call, weight is 375. ( 380 previous).  Patient/Family verbalizes understanding of self-management of chronic disease. 7/21/20  Lito Rudd 67 will start to refrain form can foods, processed foods and take all medications daily as ordered. NN ask Dr Blu Quintana for studies order that he discussed at visit. He is having severe sleep apnea episodes and SOB. He is using 6-8 pillows to sleep. Med Rec completed. NN provided contact information and hours of operations.  Weigh daily and report weight gain to NN ASAP, FU in 1 week Im

## 2020-08-19 ENCOUNTER — PATIENT OUTREACH (OUTPATIENT)
Dept: FAMILY MEDICINE CLINIC | Age: 40
End: 2020-08-19

## 2020-08-19 ENCOUNTER — TELEPHONE (OUTPATIENT)
Dept: FAMILY MEDICINE CLINIC | Age: 40
End: 2020-08-19

## 2020-08-19 DIAGNOSIS — I50.9 CHRONIC CONGESTIVE HEART FAILURE, UNSPECIFIED HEART FAILURE TYPE (HCC): ICD-10-CM

## 2020-08-19 DIAGNOSIS — I50.9 ACUTE ON CHRONIC CONGESTIVE HEART FAILURE, UNSPECIFIED HEART FAILURE TYPE (HCC): ICD-10-CM

## 2020-08-19 RX ORDER — ALBUTEROL SULFATE 90 UG/1
2 AEROSOL, METERED RESPIRATORY (INHALATION)
Qty: 1 INHALER | Refills: 0 | Status: SHIPPED | OUTPATIENT
Start: 2020-08-19 | End: 2020-09-09 | Stop reason: SDUPTHER

## 2020-08-19 RX ORDER — LISINOPRIL 5 MG/1
TABLET ORAL
Qty: 90 TAB | Refills: 3 | Status: SHIPPED | OUTPATIENT
Start: 2020-08-19 | End: 2020-09-09 | Stop reason: SDUPTHER

## 2020-08-19 RX ORDER — METFORMIN HYDROCHLORIDE 500 MG/1
500 TABLET ORAL 2 TIMES DAILY WITH MEALS
Qty: 180 TAB | Refills: 0 | Status: SHIPPED | OUTPATIENT
Start: 2020-08-19 | End: 2020-09-09 | Stop reason: SDUPTHER

## 2020-08-19 RX ORDER — CARVEDILOL 3.12 MG/1
3.12 TABLET ORAL 2 TIMES DAILY WITH MEALS
Qty: 60 TAB | Refills: 5 | Status: SHIPPED | OUTPATIENT
Start: 2020-08-19 | End: 2020-08-31

## 2020-08-19 RX ORDER — AMLODIPINE BESYLATE 5 MG/1
5 TABLET ORAL DAILY
Qty: 90 TAB | Refills: 2 | Status: SHIPPED | OUTPATIENT
Start: 2020-08-19 | End: 2020-09-09 | Stop reason: SDUPTHER

## 2020-08-19 RX ORDER — FUROSEMIDE 20 MG/1
20 TABLET ORAL 2 TIMES DAILY
Qty: 180 TAB | Refills: 2 | Status: SHIPPED | OUTPATIENT
Start: 2020-08-19 | End: 2020-09-09 | Stop reason: SDUPTHER

## 2020-08-19 NOTE — PROGRESS NOTES
8/19/20    [address] [facility]   Chronic Disease Management Nurse Navigator Note    Diagnosis: Congestive Heart Failure    Adherence - PCP: YES  Adherence - Medications: compliant with all meds  Barriers: Other (Comments) feels overwhelmed with the CHF sx/sx, fatigued, tired. NN asked for a referral with Lucky Brunner from Dr Patsy Merida. .   ED visit with reachout: NO  If ED visit with reachout, plan: na  If ED visit without reachout, details: na    CHF  Daily weights: Yes 375-385 lbs. Range. Sodium restriction: Yes  Shortness of breath: mild  Edema: mild  Activity Intolerance: moderate    Goals      Patient verbalizes understanding of self-management goals of living with Congestive Heart Failure      8/19/20  Weight 375 lbs.today, 378 lbs yesterday, Donovan Skaggs is agreeable to a counseling with Lucky Brunner for support and tools for feeling of overwhelm. NN sent a request to Dr. Patsy Merida via in box. FU in 1 week. IM    8/6/20  Algkenzie Kwok is complaining of generalized pain, weight 385 lbs. He needs a face to face appt with Dr Patsy Merida. Denies fever, cough. FU in 1 week. Im    7/29/20  Weigh daily, avoid high sodium foods, ie: processed foods, can foods, MSG. NN notes SOB on call, weight is 375. ( 380 previous).  Patient/Family verbalizes understanding of self-management of chronic disease. 7/21/20  Donovan Skaggs will start to refrain form can foods, processed foods and take all medications daily as ordered. NN ask Dr Chastity Delgado for studies order that he discussed at visit. He is having severe sleep apnea episodes and SOB. He is using 6-8 pillows to sleep. Med Rec completed. NN provided contact information and hours of operations. Weigh daily and report weight gain to NN ASAP, FU in 1 week Im          NN provided emotional support.      Time in discussion: 55 minutes

## 2020-08-28 DIAGNOSIS — I50.9 ACUTE ON CHRONIC CONGESTIVE HEART FAILURE, UNSPECIFIED HEART FAILURE TYPE (HCC): ICD-10-CM

## 2020-08-28 DIAGNOSIS — I50.9 CHRONIC CONGESTIVE HEART FAILURE, UNSPECIFIED HEART FAILURE TYPE (HCC): ICD-10-CM

## 2020-08-31 RX ORDER — CARVEDILOL 3.12 MG/1
TABLET ORAL
Qty: 180 TAB | Refills: 0 | Status: SHIPPED | OUTPATIENT
Start: 2020-08-31 | End: 2020-09-09 | Stop reason: SDUPTHER

## 2020-08-31 RX ORDER — METFORMIN HYDROCHLORIDE 500 MG/1
TABLET ORAL
Qty: 180 TAB | Refills: 0 | OUTPATIENT
Start: 2020-08-31

## 2020-09-08 ENCOUNTER — PATIENT OUTREACH (OUTPATIENT)
Dept: FAMILY MEDICINE CLINIC | Age: 40
End: 2020-09-08

## 2020-09-08 NOTE — PROGRESS NOTES
9/8/20    Goals      Patient verbalizes understanding of self-management goals of living with Congestive Heart Failure      9/8/20  Lito Cox will  medications at [facility] today. NN called [facility] spoke to [address] [place] [address] who will fill prescriptions today. FU in 2 months. IM    8/19/20  Weight 375 lbs.today, 378 lbs yesterday, Lito Cox is agreeable to a counseling with Олег Cordon for support and tools for feeling of overwhelm. NN sent a request to Dr. Malinda Brenner via in box. FU in 1 week. IM    8/6/20  Carolyn Batista is complaining of generalized pain, weight 385 lbs. He needs a face to face appt with Dr Malinda Brenner. Denies fever, cough. FU in 1 week. Im    7/29/20  Weigh daily, avoid high sodium foods, ie: processed foods, can foods, MSG. NN notes SOB on call, weight is 375. ( 380 previous).  Patient/Family verbalizes understanding of self-management of chronic disease. 9/3/20  Lito Cox will go to  meds at [facility]. NN sent a message to Dr Malinda Brenner,     7/21/20  Lito Cox will start to refrain form can foods, processed foods and take all medications daily as ordered. NN ask Dr Nargis Medrano for studies order that he discussed at visit. He is having severe sleep apnea episodes and SOB. He is using 6-8 pillows to sleep. Med Rec completed. NN provided contact information and hours of operations.  Weigh daily and report weight gain to NN ASAP, FU in 1 week Im

## 2020-09-09 ENCOUNTER — PATIENT OUTREACH (OUTPATIENT)
Dept: FAMILY MEDICINE CLINIC | Age: 40
End: 2020-09-09

## 2020-09-09 RX ORDER — CARVEDILOL 3.12 MG/1
TABLET ORAL
Qty: 180 TAB | Refills: 2 | Status: SHIPPED | OUTPATIENT
Start: 2020-09-09 | End: 2020-10-19 | Stop reason: SDUPTHER

## 2020-09-09 RX ORDER — METFORMIN HYDROCHLORIDE 500 MG/1
500 TABLET ORAL 2 TIMES DAILY WITH MEALS
Qty: 180 TAB | Refills: 0 | Status: SHIPPED | OUTPATIENT
Start: 2020-09-09 | End: 2020-10-19 | Stop reason: SDUPTHER

## 2020-09-09 RX ORDER — FUROSEMIDE 20 MG/1
20 TABLET ORAL 2 TIMES DAILY
Qty: 180 TAB | Refills: 2 | Status: SHIPPED | OUTPATIENT
Start: 2020-09-09 | End: 2020-10-19 | Stop reason: SDUPTHER

## 2020-09-09 RX ORDER — LISINOPRIL 5 MG/1
TABLET ORAL
Qty: 90 TAB | Refills: 3 | Status: SHIPPED | OUTPATIENT
Start: 2020-09-09 | End: 2020-10-19 | Stop reason: SDUPTHER

## 2020-09-09 RX ORDER — AMLODIPINE BESYLATE 5 MG/1
5 TABLET ORAL DAILY
Qty: 90 TAB | Refills: 2 | Status: SHIPPED | OUTPATIENT
Start: 2020-09-09 | End: 2020-10-19 | Stop reason: SDUPTHER

## 2020-09-09 RX ORDER — ALBUTEROL SULFATE 90 UG/1
2 AEROSOL, METERED RESPIRATORY (INHALATION)
Qty: 1 INHALER | Refills: 0 | Status: SHIPPED | OUTPATIENT
Start: 2020-09-09 | End: 2020-10-19 | Stop reason: SDUPTHER

## 2020-09-11 NOTE — TELEPHONE ENCOUNTER
Received a request from the pt's Pharmacy for refill for Albuterol inhaler. The refills have already been sent to the Pharmacy by the provider via escript.  Nata Jeffery RN

## 2020-09-16 DIAGNOSIS — F43.29 STRESS AND ADJUSTMENT REACTION: Primary | ICD-10-CM

## 2020-09-16 NOTE — PROGRESS NOTES
Nurse navigator identified that patient is wishing to talk to our counselor due to ongoing stress associated with medical problems  She asked that I put in a referral, which I have done.  Routing to our team so that they are aware

## 2020-10-01 ENCOUNTER — PATIENT OUTREACH (OUTPATIENT)
Dept: FAMILY MEDICINE CLINIC | Age: 40
End: 2020-10-01

## 2020-10-01 ENCOUNTER — VIRTUAL VISIT (OUTPATIENT)
Dept: FAMILY MEDICINE CLINIC | Age: 40
End: 2020-10-01

## 2020-10-01 DIAGNOSIS — F32.1 MAJOR DEPRESSIVE DISORDER, SINGLE EPISODE, MODERATE (HCC): Primary | ICD-10-CM

## 2020-10-01 PROCEDURE — 90791 PSYCH DIAGNOSTIC EVALUATION: CPT | Performed by: SOCIAL WORKER

## 2020-10-01 NOTE — PROGRESS NOTES
Consent: Jessy Maravilla, who was seen by synchronous (real-time) audio-video technology, and/or patients healthcare decision maker, is aware that this patient-initiated, Telehealth encounter on 10-1-20 is a billable service, with coverage as determined by the insurance carrier. Patient is aware that he/she may receive a bill and has provided verbal consent to proceed. Pursuant to the emergency declaration under the [facility] [place] [address] [facility] authority and the [employer] Act, this Virtual Visit was conducted, with patient's (and/or legal guardian's) consent, to reduce the patient's risk of exposure to COVID-19 and provide necessary medical care. Services were provided through a video synchronous discussion virtually to substitute for in-person clinic visit. Patient and provider were located at their individual homes. [address] [place] [address] presented for initial counseling session at home. He was well-groomed, slightly out of breath, engaged, articulate, alert and oriented. Mood and affect congruent. For past year he has had developing CHF, diabetes, a lot of joint pain, and increasing fatigue. He scored within range for moderate depression on screening and he agreed with this opinion. He denied any SI or HI. He is not currently taking any anti-depression medication and denied any history or family history of depression or anxiety. He currently lives with his sister and her children and his mother. 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 He is a morejon and got pride out of his work; he had to quit working about 6 months ago due to the CHF and bone pain. He expressed worry about what is wrong with him and why he is not losing weight if he is watching his diet and walking several times a week. He stated he takes his medications as prescribed.  Clinician explored how his chronic illness is affecting his motivation and mood, provided some psycho-education on effects of chronic illness on the mind, and engaged him in improving his list of coping skills. He identified previous enjoyment of fishing and clinician encouraged him to do that weekly, if possible. Follow up in three weeks. He is to create a column of enjoyable activities to balance out the number of physical issues worrying him at this time.

## 2020-10-01 NOTE — PROGRESS NOTES
10/1/20    Goals      Patient verbalizes understanding of self-management goals of living with Congestive Heart Failure      9/8/20  Lito Cox will  medications at [facility] today. NN called [facility] spoke to [address] [place] [address] who will fill prescriptions today. FU in 2 months. IM    8/19/20  Weight 375 lbs.today, 378 lbs yesterday, Lito Cox is agreeable to a counseling with Jaylen Perez for support and tools for feeling of overwhelm. NN sent a request to Dr. Hamlet Andres via in box. FU in 1 week. IM    8/6/20  Carolyn Williamson is complaining of generalized pain, weight 385 lbs. He needs a face to face appt with Dr Hamlet Andres. Denies fever, cough. FU in 1 week. Im    7/29/20  Weigh daily, avoid high sodium foods, ie: processed foods, can foods, MSG. NN notes SOB on call, weight is 375. ( 380 previous).  Patient/Family verbalizes understanding of self-management of chronic disease. 10/1/20  Lito Cox will wait for a FU appt call from the  with Dr Hamlet Andres for c/o of constipation x 4 days. Scant amounts of stool at times. Feels bloated. FU in 3 days. IM    9/3/20  Lito Cox will go to  meds at [facility]. NN sent a message to Dr Hamlet Andres,     7/21/20  Lito Cox will start to refrain form can foods, processed foods and take all medications daily as ordered. NN ask Dr Robyn Fernandez for studies order that he discussed at visit. He is having severe sleep apnea episodes and SOB. He is using 6-8 pillows to sleep. Med Rec completed. NN provided contact information and hours of operations.  Weigh daily and report weight gain to NN ASAP, FU in 1 week Im

## 2020-10-02 ENCOUNTER — TELEPHONE (OUTPATIENT)
Dept: SLEEP MEDICINE | Age: 40
End: 2020-10-02

## 2020-10-02 NOTE — TELEPHONE ENCOUNTER
Justo Read to confirm patient' appt and to get NPP emailed to her. Changed patient's location because patient is closer to [facility] and mailed NPP to patient because Michael Auguste stated patient did not receive NPP,yet.

## 2020-10-02 NOTE — TELEPHONE ENCOUNTER
Called patient , informed about new in person  Office appointment ,since it is closer for patient and mailed NPP.   Access was informed about in person visit and emailed NPP

## 2020-10-09 ENCOUNTER — OFFICE VISIT (OUTPATIENT)
Dept: SLEEP MEDICINE | Age: 40
End: 2020-10-09
Payer: SUBSIDIZED

## 2020-10-09 VITALS
DIASTOLIC BLOOD PRESSURE: 71 MMHG | OXYGEN SATURATION: 95 % | HEIGHT: 72 IN | TEMPERATURE: 98.9 F | WEIGHT: 315 LBS | SYSTOLIC BLOOD PRESSURE: 113 MMHG | HEART RATE: 94 BPM | BODY MASS INDEX: 42.66 KG/M2

## 2020-10-09 DIAGNOSIS — G47.33 OSA (OBSTRUCTIVE SLEEP APNEA): Primary | ICD-10-CM

## 2020-10-09 DIAGNOSIS — E66.01 MORBID OBESITY WITH BMI OF 50.0-59.9, ADULT (HCC): ICD-10-CM

## 2020-10-09 PROCEDURE — 99204 OFFICE O/P NEW MOD 45 MIN: CPT | Performed by: SPECIALIST

## 2020-10-09 NOTE — PROGRESS NOTES
[facility]., [facility]. [place], [address]  Tel.  [phone]  Fax. [phone] [facility], [address] [facility]  Tel.  [phone]  Fax. [phone] [address]  Tel.  [phone]  Fax. [phone]       Chief Complaint       No chief complaint on file. MELISSA Faith is 47 y.o. male seen for evaluation of a sleep disorder. Interpretive services assisted during the evaluation. Patient notes a history of snoring. Snoring is described as loud. Associated with apparent apnea. He notes vivid dreaming/nightmares, sleep talking, bruxism, waking with a gasp or snort, leg kicking/twitching, getting out of bed while still asleep, tongue biting. He notes significant daytime fatigue and may easily doze if he is seated and inactive such as reading, watching TV, riding as a passenger, seated quietly after lunch or in a car stopped for several minutes. The patient has not undergone diagnostic testing for the current problems. No Known Allergies    Current Outpatient Medications   Medication Sig Dispense Refill    albuterol (PROVENTIL HFA, VENTOLIN HFA, PROAIR HFA) 90 mcg/actuation inhaler Take 2 Puffs by inhalation every four (4) hours as needed for Wheezing or Cough. 1 Inhaler 0    amLODIPine (NORVASC) 5 mg tablet Take 1 Tab by mouth daily. 90 Tab 2    metFORMIN (GLUCOPHAGE) 500 mg tablet Take 1 Tab by mouth two (2) times daily (with meals). 180 Tab 0    lisinopriL (PRINIVIL, ZESTRIL) 5 mg tablet Take 1 tablet by mouth once daily 90 Tab 3    furosemide (LASIX) 20 mg tablet Take 1 Tab by mouth two (2) times a day. 180 Tab 2    carvediloL (COREG) 3.125 mg tablet TAKE 1 TABLET BY MOUTH TWICE DAILY WITH MEALS 180 Tab 2        He  has a past medical history of Diabetes ([address] Heart failure ([address] and Hypertension. He  has a past surgical history that includes hx orthopaedic.     He family history is not on file. He  reports that he has never smoked. He has never used smokeless tobacco. He reports previous alcohol use. He reports that he does not use drugs. Review of Systems:  Review of Systems   Constitutional: Negative for chills and fever. HENT: Negative for hearing loss. Eyes: Negative for double vision. Respiratory: Positive for cough. Cardiovascular: Positive for chest pain. Genitourinary: Positive for frequency. Musculoskeletal: Positive for back pain. Neurological: Positive for dizziness, tingling and headaches. Psychiatric/Behavioral: Negative for memory loss. Objective:     Visit Vitals  /71   Pulse 94   Temp 98.9 °F (37.2 °C)   Ht 6' (1.829 m)   Wt (!) 380 lb (172.4 kg)   SpO2 95%   BMI 51.54 kg/m²     Body mass index is 51.54 kg/m². General:    cooperative   Eyes:  Pupils equal and reactive, no nystagmus   Oropharynx:   Mallampati score IV, tongue normal, uvula prominent   Tonsils:   tonsils 1+   Neck:   No carotid bruits; Chest/Lungs:  Clear on auscultation    CVS:  Normal rate, regular rhythm   Skin:  Warm to touch; no obvious rashes   Neuro:  face symmetrical, tongue movement normal   Psych:  Normal affect,  normal countenance        Assessment:       ICD-10-CM ICD-9-CM    1. OC (obstructive sleep apnea)  G47.33 327.23    2. Morbid obesity with BMI of 50.0-59.9, adult ([facility]  E66.01 278.01     Z68.43 V85.43        History consistent with sleep disordered breathing. He will be evaluated with a nocturnal polysomnogram; split per criteria. Plan:     No orders of the defined types were placed in this encounter. * Patient has a history and examination consistent with the diagnosis of sleep apnea. * Sleep testing was ordered for initial evaluation. * He was provided information on sleep apnea including corresponding risk factors and the importance of proper treatment. * Treatment options if indicated were reviewed today. Instructions:  o The patient would benefit from weight reduction measures. o Do not engage in activities requiring a normal degree of alertness if fatigue is present. o The patient understands that untreated or undertreated sleep apnea could impair judgement and the ability to function normally during the day.  o Call or return if symptoms worsen or persist.          Latonia Guallpa MD, [facility]  Electronically signed 10/09/20       This note was created using voice recognition software. Despite editing, there may be syntax errors. This note will not be viewable in [address]

## 2020-10-19 ENCOUNTER — OFFICE VISIT (OUTPATIENT)
Dept: FAMILY MEDICINE CLINIC | Age: 40
End: 2020-10-19

## 2020-10-19 ENCOUNTER — HOSPITAL ENCOUNTER (OUTPATIENT)
Dept: LAB | Age: 40
Discharge: HOME | End: 2020-10-19

## 2020-10-19 VITALS
WEIGHT: 315 LBS | TEMPERATURE: 98.1 F | SYSTOLIC BLOOD PRESSURE: 100 MMHG | HEIGHT: 67 IN | HEART RATE: 72 BPM | OXYGEN SATURATION: 95 % | DIASTOLIC BLOOD PRESSURE: 67 MMHG | BODY MASS INDEX: 49.44 KG/M2

## 2020-10-19 DIAGNOSIS — I50.9 ACUTE ON CHRONIC CONGESTIVE HEART FAILURE, UNSPECIFIED HEART FAILURE TYPE (HCC): Primary | ICD-10-CM

## 2020-10-19 DIAGNOSIS — E11.9 DIABETES MELLITUS TYPE 2, DIET-CONTROLLED (HCC): ICD-10-CM

## 2020-10-19 DIAGNOSIS — I51.7 CARDIOMEGALY: ICD-10-CM

## 2020-10-19 DIAGNOSIS — F32.1 MAJOR DEPRESSIVE DISORDER, SINGLE EPISODE, MODERATE (HCC): ICD-10-CM

## 2020-10-19 DIAGNOSIS — R06.02 SHORTNESS OF BREATH: ICD-10-CM

## 2020-10-19 DIAGNOSIS — I50.9 CHRONIC CONGESTIVE HEART FAILURE, UNSPECIFIED HEART FAILURE TYPE (HCC): ICD-10-CM

## 2020-10-19 LAB — GLUCOSE POC: NORMAL MG/DL

## 2020-10-19 PROCEDURE — 99213 OFFICE O/P EST LOW 20 MIN: CPT | Performed by: FAMILY MEDICINE

## 2020-10-19 PROCEDURE — 83036 HEMOGLOBIN GLYCOSYLATED A1C: CPT

## 2020-10-19 PROCEDURE — 85025 COMPLETE CBC W/AUTO DIFF WBC: CPT

## 2020-10-19 PROCEDURE — 80053 COMPREHEN METABOLIC PANEL: CPT

## 2020-10-19 PROCEDURE — 82962 GLUCOSE BLOOD TEST: CPT | Performed by: FAMILY MEDICINE

## 2020-10-19 PROCEDURE — 80061 LIPID PANEL: CPT

## 2020-10-19 RX ORDER — LISINOPRIL 5 MG/1
TABLET ORAL
Qty: 90 TAB | Refills: 3 | Status: SHIPPED | OUTPATIENT
Start: 2020-10-19 | End: 2021-03-08 | Stop reason: SDUPTHER

## 2020-10-19 RX ORDER — AMLODIPINE BESYLATE 5 MG/1
5 TABLET ORAL DAILY
Qty: 90 TAB | Refills: 2 | Status: SHIPPED | OUTPATIENT
Start: 2020-10-19 | End: 2021-03-08 | Stop reason: SDUPTHER

## 2020-10-19 RX ORDER — CARVEDILOL 3.12 MG/1
TABLET ORAL
Qty: 180 TAB | Refills: 2 | Status: SHIPPED | OUTPATIENT
Start: 2020-10-19 | End: 2021-03-08 | Stop reason: SDUPTHER

## 2020-10-19 RX ORDER — FUROSEMIDE 20 MG/1
20 TABLET ORAL 2 TIMES DAILY
Qty: 180 TAB | Refills: 2 | Status: SHIPPED | OUTPATIENT
Start: 2020-10-19 | End: 2021-03-08 | Stop reason: SDUPTHER

## 2020-10-19 RX ORDER — METFORMIN HYDROCHLORIDE 500 MG/1
500 TABLET ORAL 2 TIMES DAILY WITH MEALS
Qty: 180 TAB | Refills: 0 | Status: SHIPPED | OUTPATIENT
Start: 2020-10-19 | End: 2020-12-28

## 2020-10-19 RX ORDER — ALBUTEROL SULFATE 90 UG/1
2 AEROSOL, METERED RESPIRATORY (INHALATION)
Qty: 1 INHALER | Refills: 0 | Status: SHIPPED | OUTPATIENT
Start: 2020-10-19 | End: 2021-03-08 | Stop reason: SDUPTHER

## 2020-10-19 RX ORDER — DIGOXIN 250 MCG
0.25 TABLET ORAL DAILY
Qty: 30 TAB | Refills: 1 | Status: SHIPPED | OUTPATIENT
Start: 2020-10-19 | End: 2020-12-16

## 2020-10-19 RX ORDER — CITALOPRAM 20 MG/1
20 TABLET, FILM COATED ORAL DAILY
Qty: 30 TAB | Refills: 3 | Status: SHIPPED | OUTPATIENT
Start: 2020-10-19 | End: 2021-03-01

## 2020-10-19 NOTE — PROGRESS NOTES
Coordination of Care  1. Have you been to the ER, urgent care clinic since your last visit? Hospitalized since your last visit? No    2. Have you seen or consulted any other health care providers outside of the [address] [place] [address] since your last visit? Include any pap smears or colon screening. No    Does the patient need refills? YES    Learning Assessment Complete?  yes  Depression Screening complete in the past 12 months? yes

## 2020-10-19 NOTE — PROGRESS NOTES
[address] [place] [address] A Jan Sanabria is a 47 y.o. male. HPI  Patient states he is not feeling well. States he has constant dyspnea now. He keeps accumulating fluid. He gets short of breath very quickly. He has tried to lower his weight but is not able to. He has joint pains too after walking some distance. Review of Systems   HENT: Negative for ear pain, hearing loss and tinnitus. Eyes: Negative for blurred vision, double vision, photophobia and pain. Respiratory: Positive for shortness of breath. Cardiovascular: Positive for orthopnea and PND. Negative for leg swelling. Gastrointestinal: Negative for abdominal pain, heartburn, nausea and vomiting. Genitourinary: Negative for dysuria, frequency, hematuria and urgency. Musculoskeletal: Positive for joint pain. Negative for back pain, myalgias and neck pain. Neurological: Negative for headaches. /67 (BP 1 Location: Left arm, BP Patient Position: Sitting)   Pulse 72   Temp 98.1 °F (36.7 °C)   Ht 5' 6.93\" (1.7 m)   Wt (!) 385 lb (174.6 kg)   SpO2 95%   BMI 60.43 kg/m²   Physical Exam  Constitutional:       General: He is not in acute distress. Appearance: He is not ill-appearing. HENT:      Head: Normocephalic. Right Ear: Tympanic membrane normal. There is no impacted cerumen. Left Ear: Tympanic membrane normal. There is no impacted cerumen. Nose: Nose normal.      Mouth/Throat:      Mouth: Mucous membranes are moist.      Pharynx: No oropharyngeal exudate or posterior oropharyngeal erythema. Eyes:      Pupils: Pupils are equal, round, and reactive to light. Neck:      Musculoskeletal: Normal range of motion. No neck rigidity or muscular tenderness. Cardiovascular:      Rate and Rhythm: Normal rate and regular rhythm. Pulses: Normal pulses. Heart sounds: No murmur. Pulmonary:      Effort: Pulmonary effort is normal.      Breath sounds: Normal breath sounds. No wheezing or rhonchi. Abdominal:      General: Bowel sounds are normal. There is no distension. Palpations: Abdomen is soft. There is no mass. Tenderness: There is no abdominal tenderness. Musculoskeletal: Normal range of motion. Skin:     General: Skin is warm. Coloration: Skin is not jaundiced or pale. Neurological:      Mental Status: He is alert. Mental status is at baseline. Cranial Nerves: No cranial nerve deficit. Sensory: No sensory deficit. ASSESSMENT and PLAN  Diagnoses and all orders for this visit:    1. Acute on chronic congestive heart failure, unspecified heart failure type ([facility]  -     LIPID PANEL; Future  -     METABOLIC PANEL, COMPREHENSIVE; Future  -     CBC WITH AUTOMATED DIFF; Future  -     HEMOGLOBIN A1C WITH EAG; Future  -     lisinopriL (PRINIVIL, ZESTRIL) 5 mg tablet; Take 1 tablet by mouth once daily  -     furosemide (LASIX) 20 mg tablet; Take 1 Tab by mouth two (2) times a day. -     carvediloL (COREG) 3.125 mg tablet; TAKE 1 TABLET BY MOUTH TWICE DAILY WITH MEALS  -     amLODIPine (NORVASC) 5 mg tablet; Take 1 Tab by mouth daily.  -     REFERRAL TO CARDIOLOGY  -     digoxin (LANOXIN) 0.25 mg tablet; Take 1 Tab by mouth daily. 2. Diabetes mellitus type 2, diet-controlled (HCC)  -     AMB POC GLUCOSE BLOOD, BY GLUCOSE MONITORING DEVICE  -     metFORMIN (GLUCOPHAGE) 500 mg tablet; Take 1 Tab by mouth two (2) times daily (with meals). 3. Chronic congestive heart failure, unspecified heart failure type ([facility]    4. Cardiomegaly    5. Shortness of breath  -     albuterol (PROVENTIL HFA, VENTOLIN HFA, PROAIR HFA) 90 mcg/actuation inhaler; Take 2 Puffs by inhalation every four (4) hours as needed for Wheezing or Cough. 6. Major depressive disorder, single episode, moderate (HCC)  -     citalopram (CELEXA) 20 mg tablet; Take 1 Tab by mouth daily.       Patient with CHF who has not been able to establish with cardiology,  We will refill his medication and try to refer him with AN program.  Depression, so far has received therapy, would like to start SSRI  We will follow up via virtual visit in 2 weeks

## 2020-10-19 NOTE — PROGRESS NOTES
Results for orders placed or performed in visit on 10/19/20   AMB POC GLUCOSE BLOOD, BY GLUCOSE MONITORING DEVICE   Result Value Ref Range    Glucose POC nf 107 mg/dL

## 2020-10-20 LAB
ALBUMIN SERPL-MCNC: 3.8 G/DL (ref 3.5–5)
ALBUMIN/GLOB SERPL: 1 {RATIO} (ref 1.1–2.2)
ALP SERPL-CCNC: 85 U/L (ref 45–117)
ALT SERPL-CCNC: 29 U/L (ref 12–78)
ANION GAP SERPL CALC-SCNC: 11 MMOL/L (ref 5–15)
AST SERPL-CCNC: 22 U/L (ref 15–37)
BASOPHILS # BLD: 0 K/UL (ref 0–0.1)
BASOPHILS NFR BLD: 0 % (ref 0–1)
BILIRUB SERPL-MCNC: 0.3 MG/DL (ref 0.2–1)
BUN SERPL-MCNC: 11 MG/DL (ref 6–20)
BUN/CREAT SERPL: 15 (ref 12–20)
CALCIUM SERPL-MCNC: 8.9 MG/DL (ref 8.5–10.1)
CHLORIDE SERPL-SCNC: 102 MMOL/L (ref 97–108)
CHOLEST SERPL-MCNC: 156 MG/DL
CO2 SERPL-SCNC: 26 MMOL/L (ref 21–32)
CREAT SERPL-MCNC: 0.71 MG/DL (ref 0.7–1.3)
DIFFERENTIAL METHOD BLD: NORMAL
EOSINOPHIL # BLD: 0.3 K/UL (ref 0–0.4)
EOSINOPHIL NFR BLD: 4 % (ref 0–7)
ERYTHROCYTE [DISTWIDTH] IN BLOOD BY AUTOMATED COUNT: 13.6 % (ref 11.5–14.5)
EST. AVERAGE GLUCOSE BLD GHB EST-MCNC: 137 MG/DL
GLOBULIN SER CALC-MCNC: 3.8 G/DL (ref 2–4)
GLUCOSE SERPL-MCNC: 111 MG/DL (ref 65–100)
HBA1C MFR BLD: 6.4 % (ref 4–5.6)
HCT VFR BLD AUTO: 41.8 % (ref 36.6–50.3)
HDLC SERPL-MCNC: 42 MG/DL
HDLC SERPL: 3.7 {RATIO} (ref 0–5)
HGB BLD-MCNC: 13.2 G/DL (ref 12.1–17)
IMM GRANULOCYTES # BLD AUTO: 0 K/UL (ref 0–0.04)
IMM GRANULOCYTES NFR BLD AUTO: 0 % (ref 0–0.5)
LDLC SERPL CALC-MCNC: 89.6 MG/DL (ref 0–100)
LIPID PROFILE,FLP: NORMAL
LYMPHOCYTES # BLD: 2 K/UL (ref 0.8–3.5)
LYMPHOCYTES NFR BLD: 25 % (ref 12–49)
MCH RBC QN AUTO: 28.9 PG (ref 26–34)
MCHC RBC AUTO-ENTMCNC: 31.6 G/DL (ref 30–36.5)
MCV RBC AUTO: 91.5 FL (ref 80–99)
MONOCYTES # BLD: 0.7 K/UL (ref 0–1)
MONOCYTES NFR BLD: 9 % (ref 5–13)
NEUTS SEG # BLD: 5.1 K/UL (ref 1.8–8)
NEUTS SEG NFR BLD: 62 % (ref 32–75)
NRBC # BLD: 0 K/UL (ref 0–0.01)
NRBC BLD-RTO: 0 PER 100 WBC
PLATELET # BLD AUTO: 279 K/UL (ref 150–400)
PMV BLD AUTO: 10.9 FL (ref 8.9–12.9)
POTASSIUM SERPL-SCNC: 3.9 MMOL/L (ref 3.5–5.1)
PROT SERPL-MCNC: 7.6 G/DL (ref 6.4–8.2)
RBC # BLD AUTO: 4.57 M/UL (ref 4.1–5.7)
SODIUM SERPL-SCNC: 139 MMOL/L (ref 136–145)
TRIGL SERPL-MCNC: 122 MG/DL (ref ?–150)
VLDLC SERPL CALC-MCNC: 24.4 MG/DL
WBC # BLD AUTO: 8.3 K/UL (ref 4.1–11.1)

## 2020-10-21 NOTE — PROGRESS NOTES
Normal kidney function, liver function, electrolytes, cholesterol, blood count  Hemoglobin a1c is 6.4% which show good control of sugar levels  Patient can be informed

## 2020-10-21 NOTE — PROGRESS NOTES
T/C with the pt. I gave the patient the providers lab results and shared the recommendations from the doctor. Patient verbalized understanding and denied having further questions.  Salma Haskins, CMA

## 2020-10-22 ENCOUNTER — DOCUMENTATION ONLY (OUTPATIENT)
Dept: FAMILY MEDICINE CLINIC | Age: 40
End: 2020-10-22

## 2020-10-27 ENCOUNTER — OFFICE VISIT (OUTPATIENT)
Dept: SLEEP MEDICINE | Age: 40
End: 2020-10-27

## 2020-10-27 DIAGNOSIS — G47.33 OSA (OBSTRUCTIVE SLEEP APNEA): Primary | ICD-10-CM

## 2020-10-27 NOTE — PROGRESS NOTES
Order for COVID-19 test prior to split study. Orders Placed This Encounter    NOVEL CORONAVIRUS (COVID-19)     Scheduling Instructions:      1) Due to current limited availability of the COVID-19 PCR test, tests will be prioritized and may not be completed.              2) Order only if the test result will change clinical management or necessary for a return to mission-critical employment decision.              3) Print and instruct patient to adhere to CDC home isolation program. (Link Above)              4) Set up or refer patient for a monitoring program.              5) Have patient sign up for and leverage [employer] (if not previously done). Order Specific Question:   Is this test for diagnosis or screening? Answer:   Screening     Order Specific Question:   Symptomatic for COVID-19 as defined by CDC? Answer:   No     Order Specific Question:   Hospitalized for COVID-19? Answer:   No     Order Specific Question:   Admitted to ICU for COVID-19? Answer:   No     Order Specific Question:   Employed in healthcare setting? Answer:   Unknown     Order Specific Question:   Resident in a congregate (group) care setting? Answer:   Unknown     Order Specific Question:   Previously tested for COVID-19?      Answer:   [address], NP, [facility]  10/27/20

## 2020-10-29 LAB — SARS-COV-2, NAA: NOT DETECTED

## 2020-11-03 ENCOUNTER — DOCUMENTATION ONLY (OUTPATIENT)
Dept: FAMILY MEDICINE CLINIC | Age: 40
End: 2020-11-03

## 2020-11-03 ENCOUNTER — PATIENT OUTREACH (OUTPATIENT)
Dept: FAMILY MEDICINE CLINIC | Age: 40
End: 2020-11-03

## 2020-11-04 ENCOUNTER — HOSPITAL ENCOUNTER (OUTPATIENT)
Dept: SLEEP MEDICINE | Age: 40
Discharge: HOME | End: 2020-11-04
Attending: SPECIALIST
Payer: SUBSIDIZED

## 2020-11-04 VITALS
HEIGHT: 66 IN | HEART RATE: 75 BPM | DIASTOLIC BLOOD PRESSURE: 69 MMHG | BODY MASS INDEX: 50.62 KG/M2 | SYSTOLIC BLOOD PRESSURE: 112 MMHG | WEIGHT: 315 LBS | OXYGEN SATURATION: 95 % | TEMPERATURE: 97.8 F

## 2020-11-04 DIAGNOSIS — E66.01 MORBID OBESITY WITH BMI OF 50.0-59.9, ADULT (HCC): ICD-10-CM

## 2020-11-04 DIAGNOSIS — G47.33 OSA (OBSTRUCTIVE SLEEP APNEA): ICD-10-CM

## 2020-11-04 PROCEDURE — 95811 POLYSOM 6/>YRS CPAP 4/> PARM: CPT | Performed by: SPECIALIST

## 2020-11-04 NOTE — PROGRESS NOTES
11/4/20      Goals      Patient verbalizes understanding of self-management goals of living with Congestive Heart Failure      9/8/20  Lito Cox will  medications at [facility] today. NN called [facility] spoke to [address] [place] [address] who will fill prescriptions today. FU in 2 months. IM    8/19/20  Weight 375 lbs.today, 378 lbs yesterday, Lito Cox is agreeable to a counseling with Renee Guevara for support and tools for feeling of overwhelm. NN sent a request to Dr. Henry Garcia via in box. FU in 1 week. IM    8/6/20  [address] is complaining of generalized pain, weight 385 lbs. He needs a face to face appt with Dr Henry Garcia. Denies fever, cough. FU in 1 week. Im    7/29/20  Weigh daily, avoid high sodium foods, ie: processed foods, can foods, MSG. NN notes SOB on call, weight is 375. ( 380 previous).  Patient/Family verbalizes understanding of self-management of chronic disease. 11/3/20  Lito Cox will attend scheduled appts with Renee Guevara, calling NN to cancel if barriers. Started antidepressant and feels better, more filled with energy, generalized pain is resolved. Weigh daily, 386 lbs. Lito Cox will stop Annmarie Flower visits for now. NN will inform Renee Guevara of this decisions. FU in 1 month. IM     10/1/20  Lito Cox will wait for a FU appt call from the  with Dr Henry Garcia for c/o of constipation x 4 days. Scant amounts of stool at times. Feels bloated. FU in 3 days. IM    9/3/20  Lito Cox will go to  meds at [facility]. NN sent a message to Dr Henry Garcia,     7/21/20  Lito Cox will start to refrain form can foods, processed foods and take all medications daily as ordered. NN ask Dr Maddy Rodriguez for studies order that he discussed at visit. He is having severe sleep apnea episodes and SOB. He is using 6-8 pillows to sleep. Med Rec completed. NN provided contact information and hours of operations.  Weigh daily and report weight gain to NN ASAP, FU in 1 week Im

## 2020-11-05 ENCOUNTER — DOCUMENTATION ONLY (OUTPATIENT)
Dept: SLEEP MEDICINE | Age: 40
End: 2020-11-05

## 2020-11-05 ENCOUNTER — TELEPHONE (OUTPATIENT)
Dept: FAMILY MEDICINE CLINIC | Age: 40
End: 2020-11-05

## 2020-11-05 NOTE — TELEPHONE ENCOUNTER
Tc from the pt on the CBN phone. Int # [id number]. The pt stated he has an appt with the Cardiologist on 11/16/20. The pt stated the Cardiologist stated he needed to see the PCP before the appt with Cardiologist. The pt was given the same day appt line # and protocol. The pt had been calling the wrong number for an appt. The last providers progress note was 10/19/20. It said F/U in 2 weeks with a VV. Not sure if the Cardiologist needed the pt to have  F2F provider visit or if VV is ok. Routed to the provider.  Girish Stearns, RN

## 2020-11-05 NOTE — PROGRESS NOTES
[address] [facility] Jules. [place], [address]  Tel.  [phone]  Fax. 100 [facility] [address]  [place], [address] [facility]  Tel.  [phone]  Fax. [phone] 31 [facility] Lisha Christopher  Tel.  [phone]  Fax. [phone]     Sleep Study Technical Notes        PRE-Test:  Maricruz  (: 1980) arrived in the lobby. Patient was greeted, temperature checked (97.8 °) and screening questions asked. The patient was taken to the Sleep Center and taken directly to his/her room. BP (112/69) and SaO2 (95%) were taken. Scale currently not available. PT decline a . PT  family member  assisted Procedure explained to the patient and questions were answered. The patient expressed understanding of the procedure. Electrodes were applied without incident. The patient was placed in bed and the study was started. Acquisition Notes:   Lights off: 10:45pm     Respiratory events: APNEA, HYPOPNEA, RR 16-20   ECG:  NORMAL SINUS RHYTHM 50-63bpm    PAP titration: CPAP/ BIPAP  24/20cm   Other comments: pt slept well with BIPAP at higher pressure.  Desensitization Mask(s) Used: RESMED AIRTOUCH F20 FF MASK SIZE ( L )     POST Test:   Patient was awakened. Electrodes were removed. The patient was discharged after answering the Post Questionnaire. Patient stated that he/she was alert and ok to drive.  Equipment and room cleaned per infection control policy.

## 2020-11-09 ENCOUNTER — TELEPHONE (OUTPATIENT)
Dept: SLEEP MEDICINE | Age: 40
End: 2020-11-09

## 2020-11-09 DIAGNOSIS — G47.33 OSA (OBSTRUCTIVE SLEEP APNEA): Primary | ICD-10-CM

## 2020-11-09 NOTE — TELEPHONE ENCOUNTER
Sleep study performed for potential sleep disordered breathing. During the initial portion of the study: 130.4 minutes recorded of which 86 minutes spent asleep with a sleep efficiency of 66%. Sleep onset at 10.3 minutes. Neither REM nor N3 sleep were observed. 219 respiratory events occurred of which 17 were hypopnea and 202 obstructive apnea. The overall apnea-hypopnea index was severely elevated at 152.8/h. Minimal SaO2 of 80%. During the second portion of the study CPAP and BiPAP were employed. 342.6 minutes were recorded of which 281.5 minutes spent asleep with a sleep efficiency of 82.2%. Sleep onset at 24.4 minutes; REM onset at 93.5 minutes with total REM representing 27.9% of sleep time. All sleep stages were observed. 153 respiratory events occurred of which 81 hypopnea and 72 apnea (65 obstructive, 7 central). The overall AHI was 32.6/h. Minimal SaO2 81%. CPAP initiated at 5 cm and increased to 18 cm. BiPAP started at 18/14 cm and increased to I 24/ E 20 cm. At BiPAP of 24/20 cm: 44.1 minutes recorded of which 37.5 minutes spent asleep and 31 minutes in REM. Corresponding AHI normal at 4.8/h. Patient primarily right lateral at that pressure setting. Impression: Severe sleep disordered breathing improving with BiPAP of 24/20 cm. Patient primarily right lateral at that pressure setting. Study potentially underestimated sleep apnea severity as, during the initial portion of the recording, REM sleep was not observed. Sleep technologist: Please review the study results with the patient. Order has been entered for BiPAP 24/20 cm. Limiting supine sleep would also be beneficial.  First compliance appointment to be scheduled.

## 2020-11-16 ENCOUNTER — OFFICE VISIT (OUTPATIENT)
Dept: CARDIOLOGY CLINIC | Age: 40
End: 2020-11-16
Payer: SUBSIDIZED

## 2020-11-16 VITALS
HEIGHT: 66 IN | SYSTOLIC BLOOD PRESSURE: 128 MMHG | WEIGHT: 315 LBS | DIASTOLIC BLOOD PRESSURE: 78 MMHG | BODY MASS INDEX: 50.62 KG/M2 | HEART RATE: 90 BPM | OXYGEN SATURATION: 96 %

## 2020-11-16 DIAGNOSIS — G47.33 OSA (OBSTRUCTIVE SLEEP APNEA): ICD-10-CM

## 2020-11-16 DIAGNOSIS — I51.7 ENLARGED HEART: Primary | ICD-10-CM

## 2020-11-16 PROCEDURE — 99204 OFFICE O/P NEW MOD 45 MIN: CPT | Performed by: INTERNAL MEDICINE

## 2020-11-16 PROCEDURE — 93000 ELECTROCARDIOGRAM COMPLETE: CPT | Performed by: INTERNAL MEDICINE

## 2020-11-16 NOTE — PROGRESS NOTES
Chief Complaint   Patient presents with    New Patient     enlarged heart     Visit Vitals  /78 (BP 1 Location: Right arm, BP Patient Position: Sitting)   Pulse 90   Ht 5' 6\" (1.676 m)   Wt (!) 391 lb (177.4 kg)   SpO2 96%   BMI 63.11 kg/m²         Chest pain - pain on the left side   SOB - when falling asleep at times  Dizziness denied  Swelling/Edema - BL/LE  Recent hospital visit denied

## 2020-11-16 NOTE — PROGRESS NOTES
Reason for Consult: SOB      HPI: Roxanne Irvin is a 47 y.o. male with PMH of HTN, DM, is here with symptoms of chest pain and SOB. The chest pain is left anterior wall, focal, sharp stabbing pain, moderate intensity, non radiating from past 11 months. Has also noted increasing SOB. Was recently diagnosed with OC and is in the process of getting CPAP. Non smoker. No family hx of CAD. normal sinus rhythm, normal ST segment, normal axis, normal intervals. [facility] [employer] was used on phone to translate. Plan:     1. Chest pain: Atypical but due to significant risk factors will r/o CAD. Due to his wt will proceed with Coronary CTA. 2. SOB: Check Echo for LVEF. Has ?cardiomyopathy history. 3. See Dr. Medardo Abebe in 1 month. ATTENTION:   This medical record was transcribed using an electronic medical ecords/speech recognition system. Although proofread, it may and can contain electronic, spelling and other errors. Corrections may be executed at a later time. Please feel free to contact us for any clarifications as needed. Past Medical History:   Diagnosis Date    Diabetes ([facility]     Heart failure ([facility]     Hypertension             Past Surgical History:   Procedure Laterality Date    HX ORTHOPAEDIC               No family history on file.         Social History     Socioeconomic History    Marital status: SINGLE     Spouse name: Not on file    Number of children: Not on file    Years of education: Not on file    Highest education level: Not on file   Occupational History    Not on file   Social Needs    Financial resource strain: Not on file    Food insecurity     Worry: Not on file     Inability: Not on file    Transportation needs     Medical: Not on file     Non-medical: Not on file   Tobacco Use    Smoking status: Never Smoker    Smokeless tobacco: Never Used   Substance and Sexual Activity    Alcohol use: Not Currently    Drug use: Never    Sexual activity: Not on file   Lifestyle    Physical activity     Days per week: Not on file     Minutes per session: Not on file    Stress: Not on file   Relationships    Social connections     Talks on phone: Not on file     Gets together: Not on file     Attends [employer] service: Not on file     Active member of club or organization: Not on file     Attends meetings of clubs or organizations: Not on file     Relationship status: Not on file    Intimate partner violence     Fear of current or ex partner: Not on file     Emotionally abused: Not on file     Physically abused: Not on file     Forced sexual activity: Not on file   Other Topics Concern    Not on file   Social History Narrative    Not on file         No Known Allergies         Current Outpatient Medications   Medication Sig Dispense Refill    metFORMIN (GLUCOPHAGE) 500 mg tablet Take 1 Tab by mouth two (2) times daily (with meals). 180 Tab 0    lisinopriL (PRINIVIL, ZESTRIL) 5 mg tablet Take 1 tablet by mouth once daily 90 Tab 3    furosemide (LASIX) 20 mg tablet Take 1 Tab by mouth two (2) times a day. 180 Tab 2    carvediloL (COREG) 3.125 mg tablet TAKE 1 TABLET BY MOUTH TWICE DAILY WITH MEALS 180 Tab 2    amLODIPine (NORVASC) 5 mg tablet Take 1 Tab by mouth daily. 90 Tab 2    albuterol (PROVENTIL HFA, VENTOLIN HFA, PROAIR HFA) 90 mcg/actuation inhaler Take 2 Puffs by inhalation every four (4) hours as needed for Wheezing or Cough. 1 Inhaler 0    digoxin (LANOXIN) 0.25 mg tablet Take 1 Tab by mouth daily. 30 Tab 1    citalopram (CELEXA) 20 mg tablet Take 1 Tab by mouth daily. 30 Tab 3        ROS:  12 point review of systems was performed.  All negative except for HPI     Physical Exam:  Visit Vitals  /78 (BP 1 Location: Right arm, BP Patient Position: Sitting)   Pulse 90   Ht 5' 6\" (1.676 m)   Wt (!) 391 lb (177.4 kg)   SpO2 96%   BMI 63.11 kg/m²       Gen:  Well-developed, well-nourished, in no acute distress  HEENT:  Pink conjunctivae, PERRL, hearing intact to voice, moist mucous membranes  Neck:  Supple, without masses, thyroid non-tender  Resp:  No accessory muscle use, clear breath sounds without wheezes rales or rhonchi   Card:  No murmurs, normal S1, S2 without thrills, bruits or peripheral edema  Abd:  Soft, non-tender, non-distended, normoactive bowel sounds are present, no palpable organomegaly and no detectable hernias  Lymph:  No cervical or inguinal adenopathy  Musc:  No cyanosis or clubbing  Skin:  No rashes or ulcers, skin turgor is good  Neuro:  Cranial nerves are grossly intact, no focal motor weakness, follows commands appropriately  Psych:  Good insight, oriented to person, place and time, alert     Labs:     Lab Results   Component Value Date/Time    WBC 8.3 10/19/2020 01:10 PM    HGB 13.2 10/19/2020 01:10 PM    HCT 41.8 10/19/2020 01:10 PM    PLATELET 922 64/72/6631 01:10 PM    MCV 91.5 10/19/2020 01:10 PM     Lab Results   Component Value Date/Time    Hemoglobin A1c 6.4 (H) 10/19/2020 01:10 PM    Hemoglobin A1c 5.7 (H) 02/10/2020 11:19 AM    Glucose 111 (H) 10/19/2020 01:10 PM    Glucose POC nf 107 10/19/2020 12:33 PM    LDL, calculated 89.6 10/19/2020 01:10 PM    Creatinine 0.71 10/19/2020 01:10 PM      Lab Results   Component Value Date/Time    Cholesterol, total 156 10/19/2020 01:10 PM    HDL Cholesterol 42 10/19/2020 01:10 PM    LDL, calculated 89.6 10/19/2020 01:10 PM    Triglyceride 122 10/19/2020 01:10 PM    CHOL/HDL Ratio 3.7 10/19/2020 01:10 PM     Lab Results   Component Value Date/Time    ALT (SGPT) 29 10/19/2020 01:10 PM    Alk.  phosphatase 85 10/19/2020 01:10 PM    Bilirubin, total 0.3 10/19/2020 01:10 PM    Albumin 3.8 10/19/2020 01:10 PM    Protein, total 7.6 10/19/2020 01:10 PM    PLATELET 287 22/61/1303 01:10 PM     No results found for: INR, INREXT, PTMR, PTP, PT1, PT2, INREXT   Lab Results   Component Value Date/Time    GFR est non-AA >60 10/19/2020 01:10 PM    GFR est AA >60 10/19/2020 01:10 PM    Creatinine 0.71 10/19/2020 01:10 PM    BUN 11 10/19/2020 01:10 PM    Sodium 139 10/19/2020 01:10 PM    Potassium 3.9 10/19/2020 01:10 PM    Chloride 102 10/19/2020 01:10 PM    CO2 26 10/19/2020 01:10 PM     No results found for: PSA, PSA2, PSAR1, PSA1, PSAR2, PSA3, PSAR3, [id number], [id number], PSALT  No results found for: TSH, TSH2, TSH3, TSHP, TSHELE, TSHEXT, TT3, T3U, T3UP, FRT3, FT3, FT4, FT4P, T4, T4P, FT4T, TT7, TSHEXT   Lab Results   Component Value Date/Time    Glucose 111 (H) 10/19/2020 01:10 PM    Glucose POC nf 107 10/19/2020 12:33 PM      Lab Results   Component Value Date/Time    Troponin-I, Qt. <0.05 01/30/2020 04:30 PM      Lab Results   Component Value Date/Time    NT pro- (H) 01/30/2020 02:15 PM      Lab Results   Component Value Date/Time    Sodium 139 10/19/2020 01:10 PM    Potassium 3.9 10/19/2020 01:10 PM    Chloride 102 10/19/2020 01:10 PM    CO2 26 10/19/2020 01:10 PM    Anion gap 11 10/19/2020 01:10 PM    Glucose 111 (H) 10/19/2020 01:10 PM    BUN 11 10/19/2020 01:10 PM    Creatinine 0.71 10/19/2020 01:10 PM    BUN/Creatinine ratio 15 10/19/2020 01:10 PM    GFR est AA >60 10/19/2020 01:10 PM    GFR est non-AA >60 10/19/2020 01:10 PM    Calcium 8.9 10/19/2020 01:10 PM      Lab Results   Component Value Date/Time    Sodium 139 10/19/2020 01:10 PM    Potassium 3.9 10/19/2020 01:10 PM    Chloride 102 10/19/2020 01:10 PM    CO2 26 10/19/2020 01:10 PM    Anion gap 11 10/19/2020 01:10 PM    Glucose 111 (H) 10/19/2020 01:10 PM    BUN 11 10/19/2020 01:10 PM    Creatinine 0.71 10/19/2020 01:10 PM    BUN/Creatinine ratio 15 10/19/2020 01:10 PM    GFR est AA >60 10/19/2020 01:10 PM    GFR est non-AA >60 10/19/2020 01:10 PM    Calcium 8.9 10/19/2020 01:10 PM    Bilirubin, total 0.3 10/19/2020 01:10 PM    ALT (SGPT) 29 10/19/2020 01:10 PM    Alk.  phosphatase 85 10/19/2020 01:10 PM    Protein, total 7.6 10/19/2020 01:10 PM    Albumin 3.8 10/19/2020 01:10 PM    Globulin 3.8 10/19/2020 01:10 PM    A-G Ratio 1.0 (L) 10/19/2020 01:10 PM      Lab Results   Component Value Date/Time    Hemoglobin A1c 6.4 (H) 10/19/2020 01:10 PM         No results for input(s): CPK, CKMB, TROIQ in the last 72 hours. No lab exists for component: CKQMB, CPKMB        Problem List:     Problem List  Date Reviewed: 10/19/2020          Codes Class Noted    OC (obstructive sleep apnea) ICD-10-CM: G47.33  ICD-9-CM: 327.23  10/27/2020        Obesity, morbid ([address] ICD-10-CM: E66.01  ICD-9-CM: 278.01  2/24/2020                Sukumar Carter MD, [facility]

## 2020-11-17 DIAGNOSIS — R07.9 CHEST PAIN, UNSPECIFIED TYPE: Primary | ICD-10-CM

## 2020-11-23 NOTE — PROGRESS NOTES
All orders entered per verbal orders by Dr. Marisol Gutierrez:      Coronary CTA- Chest pain     Echo- SOB

## 2020-11-27 RX ORDER — ATENOLOL 25 MG/1
TABLET ORAL
Qty: 2 TAB | Refills: 0 | OUTPATIENT
Start: 2020-11-27 | End: 2020-12-15 | Stop reason: SDUPTHER

## 2020-11-27 NOTE — PROGRESS NOTES
Per VO of Dr. Escobedo Meo: 11/16/2020    Future Appointments   Date Time Provider Jermaine Dan   11/30/2020  4:00 PM GIOVANNI PERALES BS AMB   12/8/2020  2:20 PM Sultana Velez MD [place] [facility] AM[facility]   12/8/2020  2:40 PM Sultana Velez MD [place] [facility] BS AMB   12/18/2020  3:40 PM [facility]       Requested Prescriptions     Signed Prescriptions Disp Refills    atenoloL (TENORMIN) 25 mg tablet 2 Tab 0     Sig: Take one tablet the night before procedure ( Cardiac CT); then one tablet 1 hour before procedure. [place] ferny tableta la noche anterior al procedimiento (TC cardíaca); luego ferny tableta 1 hora antes del procedimiento

## 2020-11-30 ENCOUNTER — ANCILLARY PROCEDURE (OUTPATIENT)
Dept: CARDIOLOGY CLINIC | Age: 40
End: 2020-11-30
Payer: SUBSIDIZED

## 2020-11-30 VITALS
DIASTOLIC BLOOD PRESSURE: 78 MMHG | BODY MASS INDEX: 50.62 KG/M2 | HEIGHT: 66 IN | SYSTOLIC BLOOD PRESSURE: 128 MMHG | WEIGHT: 315 LBS

## 2020-11-30 DIAGNOSIS — G47.33 OSA (OBSTRUCTIVE SLEEP APNEA): ICD-10-CM

## 2020-11-30 PROCEDURE — 93306 TTE W/DOPPLER COMPLETE: CPT | Performed by: INTERNAL MEDICINE

## 2020-12-01 LAB
ECHO AO ROOT DIAM: 3.38 CM
ECHO AV MEAN GRADIENT: 4.88 MMHG
ECHO AV PEAK GRADIENT: 9.17 MMHG
ECHO AV PEAK VELOCITY: 151.38 CM/S
ECHO AV VTI: 29.53 CM
ECHO LA MAJOR AXIS: 4.24 CM
ECHO LA MINOR AXIS: 1.59 CM
ECHO LV E' LATERAL VELOCITY: 13.1 CM/S
ECHO LV E' SEPTAL VELOCITY: 7.74 CM/S
ECHO LV EDV A2C: 61.93 ML
ECHO LV EDV A4C: 50.73 ML
ECHO LV EDV BP: 58.83 ML (ref 67–155)
ECHO LV EDV INDEX A4C: 19.1 ML/M2
ECHO LV EDV INDEX BP: 22.1 ML/M2
ECHO LV EDV NDEX A2C: 23.3 ML/M2
ECHO LV EJECTION FRACTION A2C: 57 PERCENT
ECHO LV EJECTION FRACTION A4C: 66 PERCENT
ECHO LV EJECTION FRACTION BIPLANE: 63.4 PERCENT (ref 55–100)
ECHO LV ESV A2C: 26.9 ML
ECHO LV ESV A4C: 17.33 ML
ECHO LV ESV BP: 21.51 ML (ref 22–58)
ECHO LV ESV INDEX A2C: 10.1 ML/M2
ECHO LV ESV INDEX A4C: 6.5 ML/M2
ECHO LV ESV INDEX BP: 8.1 ML/M2
ECHO LV INTERNAL DIMENSION DIASTOLIC: 4.97 CM (ref 4.2–5.9)
ECHO LV INTERNAL DIMENSION SYSTOLIC: 3.34 CM
ECHO LV IVSD: 1.18 CM (ref 0.6–1)
ECHO LV MASS 2D: 230.2 G (ref 88–224)
ECHO LV MASS INDEX 2D: 86.5 G/M2 (ref 49–115)
ECHO LV POSTERIOR WALL DIASTOLIC: 1.21 CM (ref 0.6–1)
ECHO LVOT PEAK GRADIENT: 5.23 MMHG
ECHO LVOT PEAK VELOCITY: 114.35 CM/S
ECHO LVOT VTI: 21.87 CM
ECHO MV A VELOCITY: 94.7 CM/S
ECHO MV AREA PHT: 3.28 CM2
ECHO MV E DECELERATION TIME (DT): 231.19 MS
ECHO MV E VELOCITY: 84.11 CM/S
ECHO MV E/A RATIO: 0.89
ECHO MV E/E' LATERAL: 6.42
ECHO MV E/E' RATIO (AVERAGED): 8.64
ECHO MV E/E' SEPTAL: 10.87
ECHO MV PRESSURE HALF TIME (PHT): 67.05 MS

## 2020-12-03 ENCOUNTER — TELEPHONE (OUTPATIENT)
Dept: FAMILY MEDICINE CLINIC | Age: 40
End: 2020-12-03

## 2020-12-03 NOTE — TELEPHONE ENCOUNTER
Patient was contacted to rescheduled missed appointment. He stated that will stop visits with counselor CORAL Santana for now. Will contact us if he ever needs to schedule follow ups with counselor. Message will be sent to [facility].     Kelly Vargas

## 2020-12-08 ENCOUNTER — OFFICE VISIT (OUTPATIENT)
Dept: SLEEP MEDICINE | Age: 40
End: 2020-12-08
Payer: SUBSIDIZED

## 2020-12-08 DIAGNOSIS — G47.33 OSA (OBSTRUCTIVE SLEEP APNEA): Primary | ICD-10-CM

## 2020-12-08 DIAGNOSIS — E66.01 OBESITY, MORBID (HCC): ICD-10-CM

## 2020-12-08 PROCEDURE — 99213 OFFICE O/P EST LOW 20 MIN: CPT | Performed by: SPECIALIST

## 2020-12-08 NOTE — PROGRESS NOTES
[address] [facility] [address]. [place], [address]  Tel.  [phone]  Fax. [address] [facility] [address]  [place], [address] [facility]  Tel.  [phone]  Fax. [phone] [address]   Tel.  [phone]  Fax. [phone]         Chief Complaint       No chief complaint on file. MELISSA Mathias is a 47 y.o. male seen for follow-up. Interpretive services assisted during the evaluation. Patient notes a history of snoring. Snoring is described as loud. Associated with apparent apnea. He notes vivid dreaming/nightmares, sleep talking, bruxism, waking with a gasp or snort, leg kicking/twitching, getting out of bed while still asleep, tongue biting.     He notes significant daytime fatigue and may easily doze if he is seated and inactive such as reading, watching TV, riding as a passenger, seated quietly after lunch or in a car stopped for several minutes. He was evaluated with a sleep study which demonstrated. Patient was evaluated with a nocturnal polysomnogram.219 respiratory events occurred of which 17 were hypopnea and 202 obstructive apnea. The overall apnea-hypopnea index was severely elevated at 152.8/h. Minimal SaO2 of 80%.     During the second portion of the study CPAP and BiPAP were employed. 342.6 minutes were recorded of which 281.5 minutes spent asleep with a sleep efficiency of 82.2%. Sleep onset at 24.4 minutes; REM onset at 93.5 minutes with total REM representing 27.9% of sleep time. All sleep stages were observed.     153 respiratory events occurred of which 81 hypopnea and 72 apnea (65 obstructive, 7 central). The overall AHI was 32.6/h. Minimal SaO2 81%. CPAP initiated at 5 cm and increased to 18 cm. BiPAP started at 18/14 cm and increased to I 24/ E 20 cm. At BiPAP of 24/20 cm: 44.1 minutes recorded of which 37.5 minutes spent asleep and 31 minutes in REM. Corresponding AHI normal at 4.8/h.   Patient primarily right lateral at that pressure setting. With the assistance of interpretive services studies were reviewed with the patient today. No Known Allergies    Current Outpatient Medications   Medication Sig Dispense Refill    atenoloL (TENORMIN) 25 mg tablet Take one tablet the night before procedure ( Cardiac CT); then one tablet 1 hour before procedure. [employer] ferny tableta la noche anterior al procedimiento (TC cardíaca); luego ferny tableta 1 hora antes del procedimiento 2 Tab 0    metFORMIN (GLUCOPHAGE) 500 mg tablet Take 1 Tab by mouth two (2) times daily (with meals). 180 Tab 0    lisinopriL (PRINIVIL, ZESTRIL) 5 mg tablet Take 1 tablet by mouth once daily 90 Tab 3    furosemide (LASIX) 20 mg tablet Take 1 Tab by mouth two (2) times a day. 180 Tab 2    carvediloL (COREG) 3.125 mg tablet TAKE 1 TABLET BY MOUTH TWICE DAILY WITH MEALS 180 Tab 2    amLODIPine (NORVASC) 5 mg tablet Take 1 Tab by mouth daily. 90 Tab 2    albuterol (PROVENTIL HFA, VENTOLIN HFA, PROAIR HFA) 90 mcg/actuation inhaler Take 2 Puffs by inhalation every four (4) hours as needed for Wheezing or Cough. 1 Inhaler 0    digoxin (LANOXIN) 0.25 mg tablet Take 1 Tab by mouth daily. 30 Tab 1    citalopram (CELEXA) 20 mg tablet Take 1 Tab by mouth daily. 30 Tab 3        He  has a past medical history of Diabetes ([address] Heart failure ([address] and Hypertension. He  has a past surgical history that includes hx orthopaedic. He family history is not on file. He  reports that he has never smoked. He has never used smokeless tobacco. He reports previous alcohol use. He reports that he does not use drugs. Review of Systems:  Unchanged per patient      Objective:     Weight: 391 lb  BMI: 63.11  General:    cooperative   Eyes:  Pupils equal and reactive, no nystagmus                            Neuro:   face symmetrical             Assessment:       ICD-10-CM ICD-9-CM    1. OC (obstructive sleep apnea)  G47.33 327.23    2.  Obesity, morbid ([facility]  E66.01 278.01      Severe sleep disordered breathing responding to BiPAP of 24/20 cm; primarily supine. Potential sources to obtain BiPAP were reviewed with the patient. He will contact the office if he is able to obtain a unit. Compliance review will be obtained at that time. Plan:   No orders of the defined types were placed in this encounter. * Patient has a history and examination consistent with the diagnosis of sleep apnea. * He was provided information on sleep apnea including corresponding risk factors and the importance of proper treatment. * Treatment options if indicated were reviewed today. Potential benefit of weight reduction was discussed. Sarah George MD, FAASM  Electronically signed 12/08/20        This note was created using voice recognition software. Despite editing, there may be syntax errors. This note will not be viewable in [address]

## 2020-12-09 ENCOUNTER — DOCUMENTATION ONLY (OUTPATIENT)
Dept: SLEEP MEDICINE | Age: 40
End: 2020-12-09

## 2020-12-15 ENCOUNTER — TELEPHONE (OUTPATIENT)
Dept: CARDIOLOGY CLINIC | Age: 40
End: 2020-12-15

## 2020-12-15 DIAGNOSIS — I50.9 ACUTE ON CHRONIC CONGESTIVE HEART FAILURE, UNSPECIFIED HEART FAILURE TYPE (HCC): ICD-10-CM

## 2020-12-15 RX ORDER — ATENOLOL 25 MG/1
TABLET ORAL
Qty: 2 TAB | Refills: 0 | OUTPATIENT
Start: 2020-12-15 | End: 2020-12-17 | Stop reason: SDUPTHER

## 2020-12-15 NOTE — TELEPHONE ENCOUNTER
Per VO of Dr. Mahamed Pete: 11/16/2020    Future Appointments   Date Time Provider Jermaine Ni   12/18/2020  8:30 AM [place] [facility] 1 [facility]   12/21/2020  4:00 PM Nathan Guerrero MD [facility] BS AMB       Requested Prescriptions     Signed Prescriptions Disp Refills    atenoloL (TENORMIN) 25 mg tablet 2 Tab 0     Sig: Take one tablet the night before procedure ( Cardiac CT); then one tablet 1 hour before procedure. [place] ferny tableta la noche anterior al procedimiento (TC cardíaca); luego ferny tableta 1 hora antes del procedimiento     Authorizing Provider: Jennifer Quezada     Ordering User: Manny Koch

## 2020-12-15 NOTE — TELEPHONE ENCOUNTER
Spoke with pt, I identified with name and birth date. Using [employer]st[employer], I returned the pts call inquiring about his Atenolol. I had sent the Rx on 11/26/20. [facility] stated they did not have the Rx. I verified with the Pt that he had his appointment for his CTA on Friday 12/18 @ 0830, and that he understood how to take the medication. Pt stated he did. I also confirmed his appointment on 12/21 @ 1600 with Dr. Giuseppe Baez. I assured the Pt I would resend the Rx today, so that he could pick it up. He was in agreement. Pt has no further questions or concerns at this time.

## 2020-12-15 NOTE — TELEPHONE ENCOUNTER
Patient states he has been to [facility] to  Atenolol but they have not received rx from office. He asks that you call when rx is sent. Please advise.      RZGPA:[phone]

## 2020-12-16 RX ORDER — DIGOXIN 250 MCG
TABLET ORAL
Qty: 30 TAB | Refills: 0 | Status: SHIPPED | OUTPATIENT
Start: 2020-12-16 | End: 2020-12-28

## 2020-12-17 ENCOUNTER — TELEPHONE (OUTPATIENT)
Dept: CARDIOLOGY CLINIC | Age: 40
End: 2020-12-17

## 2020-12-17 RX ORDER — ATENOLOL 25 MG/1
TABLET ORAL
Qty: 2 TAB | Refills: 0 | Status: SHIPPED | OUTPATIENT
Start: 2020-12-17 | End: 2021-03-08

## 2020-12-17 NOTE — TELEPHONE ENCOUNTER
Patient calling to inform that he went to  [facility] and was informed by pharmacist that they did not see prescription that was sent from our office. Patient handed phone to pharmacist whom confirmed that they had not received rx. Pharmacist request they rx be re-sent or called in. Confirmed pharmacy with patient [place] [address]). Patient needs rx for his upcoming appt at [facility] on 12/18. Please advise.

## 2020-12-17 NOTE — TELEPHONE ENCOUNTER
Received a refill request from the pt's pharmacy for Digoxin 0.25 mg. The refill request was taken care of by the provider yesterday it was ordered. Closing this encounter.   Jill Beal RN

## 2020-12-18 ENCOUNTER — HOSPITAL ENCOUNTER (OUTPATIENT)
Dept: CT IMAGING | Age: 40
Discharge: HOME | End: 2020-12-18
Attending: INTERNAL MEDICINE
Payer: SUBSIDIZED

## 2020-12-18 DIAGNOSIS — R07.9 CHEST PAIN, UNSPECIFIED TYPE: ICD-10-CM

## 2020-12-18 PROCEDURE — 75571 CT HRT W/O DYE W/CA TEST: CPT

## 2020-12-21 ENCOUNTER — OFFICE VISIT (OUTPATIENT)
Dept: CARDIOLOGY CLINIC | Age: 40
End: 2020-12-21
Payer: SUBSIDIZED

## 2020-12-21 VITALS
DIASTOLIC BLOOD PRESSURE: 78 MMHG | HEIGHT: 66 IN | OXYGEN SATURATION: 98 % | BODY MASS INDEX: 50.62 KG/M2 | WEIGHT: 315 LBS | HEART RATE: 78 BPM | SYSTOLIC BLOOD PRESSURE: 118 MMHG

## 2020-12-21 DIAGNOSIS — K42.9 UMBILICAL HERNIA WITHOUT OBSTRUCTION AND WITHOUT GANGRENE: ICD-10-CM

## 2020-12-21 DIAGNOSIS — E66.01 OBESITY, MORBID (HCC): ICD-10-CM

## 2020-12-21 DIAGNOSIS — G47.33 OSA (OBSTRUCTIVE SLEEP APNEA): Primary | ICD-10-CM

## 2020-12-21 DIAGNOSIS — R06.02 SOB (SHORTNESS OF BREATH): ICD-10-CM

## 2020-12-21 PROCEDURE — 99214 OFFICE O/P EST MOD 30 MIN: CPT | Performed by: INTERNAL MEDICINE

## 2020-12-21 NOTE — CARDIO/PULMONARY
Reached patient at his given mobile number. I was able to communicate to him the the result from his test Friday coronary artery calcium score) was good. Patient will be following up with Dr. Fernandez Side office.

## 2020-12-21 NOTE — PROGRESS NOTES
Office Follow-up    NAME: Mandy Faith   :  1980  MRM:  [id number]    Date:  2020            Assessment:     Problem List  Date Reviewed: 10/19/2020          Codes Class Noted    OC (obstructive sleep apnea) ICD-10-CM: G47.33  ICD-9-CM: 327.23  10/27/2020        Obesity, morbid ([address] ICD-10-CM: E66.01  ICD-9-CM: 278.01  2020                 Plan:      1. Chest pain: Atypical and resolved. CAC score is 0. Probability of significant CAD is low.       2. SOB: LVEF is normal. No significant valve dz. SOB likely due to morbid obesity. 3. Snore: Heavy snoring with apnenic episodes. Tells me that he has been tested and has OC. 4. Morbid Obesity: Will refer to bariatric surgery for wt loss.      See Dr. Elba Stock as needed. ATTENTION:   This medical record was transcribed using an electronic medical records/speech recognition system. Although proofread, it may and can contain electronic, spelling and other errors. Corrections may be executed at a later time. Please feel free to contact us for any clarifications as needed. Subjective:     Mandy Faith, a 47y.o. year-old who presents for followup. His chest pain is off and on mild to moderate. Continue to have SOB at rest and at exertion. Underwent Echo and CAC.        Exam:     Physical Exam:  Visit Vitals  /78 (BP 1 Location: Left arm, BP Patient Position: Sitting)   Pulse 78   Ht 5' 6\" (1.676 m)   Wt (!) 392 lb (177.8 kg)   SpO2 98%   BMI 63.27 kg/m²     General appearance - alert, well appearing, and in no distress  Mental status - affect appropriate to mood  Eyes - sclera anicteric, moist mucous membranes  Neck - supple, no significant adenopathy  Chest - clear to auscultation, no wheezes, rales or rhonchi  Heart - normal rate, regular rhythm, normal S1, S2, no murmurs, rubs, clicks or gallops  Abdomen - soft, nontender, nondistended, no masses or organomegaly  Extremities - peripheral pulses normal, no pedal edema  Skin - normal coloration  no rashes    Medications:     Current Outpatient Medications   Medication Sig    digoxin (LANOXIN) 0.25 mg tablet Take 1 tablet by mouth once daily    metFORMIN (GLUCOPHAGE) 500 mg tablet Take 1 Tab by mouth two (2) times daily (with meals).  lisinopriL (PRINIVIL, ZESTRIL) 5 mg tablet Take 1 tablet by mouth once daily    furosemide (LASIX) 20 mg tablet Take 1 Tab by mouth two (2) times a day.  carvediloL (COREG) 3.125 mg tablet TAKE 1 TABLET BY MOUTH TWICE DAILY WITH MEALS    amLODIPine (NORVASC) 5 mg tablet Take 1 Tab by mouth daily.  citalopram (CELEXA) 20 mg tablet Take 1 Tab by mouth daily.  atenoloL (TENORMIN) 25 mg tablet Take one tablet the night before procedure(Cardiac CT); then one tablet 1 hour before procedure. Leon ferny tableta la noche anterior al procedimiento(TC card nelia); luego ferny tableta 1 hora antes del procedimiento    albuterol (PROVENTIL HFA, VENTOLIN HFA, PROAIR HFA) 90 mcg/actuation inhaler Take 2 Puffs by inhalation every four (4) hours as needed for Wheezing or Cough. No current facility-administered medications for this visit. Diagnostic Data Review:       No specialty comments available.       Lab Review:     Lab Results   Component Value Date/Time    Cholesterol, total 156 10/19/2020 01:10 PM    HDL Cholesterol 42 10/19/2020 01:10 PM    LDL, calculated 89.6 10/19/2020 01:10 PM    Triglyceride 122 10/19/2020 01:10 PM    CHOL/HDL Ratio 3.7 10/19/2020 01:10 PM     Lab Results   Component Value Date/Time    Creatinine 0.71 10/19/2020 01:10 PM     Lab Results   Component Value Date/Time    BUN 11 10/19/2020 01:10 PM     Lab Results   Component Value Date/Time    Potassium 3.9 10/19/2020 01:10 PM     Lab Results   Component Value Date/Time    Hemoglobin A1c 6.4 (H) 10/19/2020 01:10 PM     Lab Results   Component Value Date/Time    HGB 13.2 10/19/2020 01:10 PM     Lab Results   Component Value Date/Time    PLATELET 359 45/02/9793 01:10 PM     No results for input(s): ROMELIA, RYLEE, TROIQ in the last 72 hours. No lab exists for component: SCOTT IZAGUIRRE             ___________________________________________________    Korina Garcia.  Billy Bernard MD, [facility]

## 2020-12-21 NOTE — PATIENT INSTRUCTIONS
Refer to Bariatric Surgery- Morbid obesity. Refer to General Surgery- Umbilical Hernia. See Dr. Sowmya Vogel as needed.

## 2020-12-21 NOTE — PROGRESS NOTES
Chief Complaint   Patient presents with    Other     Enlarged heart     Follow-up     Visit Vitals  Ht 5' 6\" (1.676 m)   Wt (!) 392 lb (177.8 kg)   BMI 63.27 kg/m²     Chest pain denied   SOB WHEN SLEEPING  Dizziness denied  Swelling in hands/feet BILATERAL FEET   Recent hospital stays denied     DOES NOT SLEEP WELL, CHRONIC HA AND DIZZINESS    PT REPORTS A RAPID WEIGHT GAIN,  HAS GAINED 4 LBS SINCE LAST VISIT. REPORTS HE'S MAINTAIN HIS DIET. MANOLO INTERRUPTER LINE.

## 2020-12-21 NOTE — PROGRESS NOTES
All orders entered per VO of Dr. Susan Zuniga:        Refer to Bariatric Surgery- Morbid obesity. Refer to General Surgery- Umbilical Hernia. See Dr. Susan Zuniga as needed.

## 2020-12-28 DIAGNOSIS — I50.9 ACUTE ON CHRONIC CONGESTIVE HEART FAILURE, UNSPECIFIED HEART FAILURE TYPE (HCC): ICD-10-CM

## 2020-12-28 DIAGNOSIS — E11.9 DIABETES MELLITUS TYPE 2, DIET-CONTROLLED (HCC): ICD-10-CM

## 2020-12-28 RX ORDER — DIGOXIN 250 MCG
TABLET ORAL
Qty: 30 TAB | Refills: 0 | Status: SHIPPED | OUTPATIENT
Start: 2020-12-28 | End: 2021-03-08 | Stop reason: SDUPTHER

## 2020-12-28 RX ORDER — METFORMIN HYDROCHLORIDE 500 MG/1
TABLET ORAL
Qty: 180 TAB | Refills: 0 | Status: SHIPPED | OUTPATIENT
Start: 2020-12-28 | End: 2021-03-08 | Stop reason: SDUPTHER

## 2021-03-01 ENCOUNTER — PATIENT OUTREACH (OUTPATIENT)
Dept: FAMILY MEDICINE CLINIC | Age: 41
End: 2021-03-01

## 2021-03-01 ENCOUNTER — TELEPHONE (OUTPATIENT)
Dept: FAMILY MEDICINE CLINIC | Age: 41
End: 2021-03-01

## 2021-03-01 DIAGNOSIS — F32.1 MAJOR DEPRESSIVE DISORDER, SINGLE EPISODE, MODERATE (HCC): ICD-10-CM

## 2021-03-01 RX ORDER — CITALOPRAM 20 MG/1
TABLET, FILM COATED ORAL
Qty: 30 TAB | Refills: 0 | Status: SHIPPED | OUTPATIENT
Start: 2021-03-01 | End: 2021-03-08 | Stop reason: SDUPTHER

## 2021-03-01 NOTE — PROGRESS NOTES
3/1/21    [facility]   Chronic Disease Management Nurse Navigator Note    Diagnosis: Congestive Heart Failure    Adherence - PCP: YES  Adherence - Medications: compliant with all meds  Barriers: None    ED visit with reachout: NO  If ED visit with reachout, plan: na  If ED visit without reachout, details: na    CHF  Daily weights: No  Sodium restriction: Yes  Shortness of breath: mild  Edema: not specifically asked  Activity Intolerance: mild    Goals      Patient verbalizes understanding of self-management goals of living with Congestive Heart Failure      9/8/20  Carolyn Washington will  medications at [facility] today. NN called [facility] spoke to [address] [place] [address] who will fill prescriptions today. FU in 2 months. IM    8/19/20  Weight 375 lbs.today, 378 lbs yesterday, Jonatan Keller is agreeable to a counseling with Laurie Jackson for support and tools for feeling of overwhelm. NN sent a request to Dr. Travis Schumacher via in box. FU in 1 week. IM    8/6/20  Carolyn Washington is complaining of generalized pain, weight 385 lbs. He needs a face to face appt with Dr Travis Schumacher. Denies fever, cough. FU in 1 week. Im    7/29/20  Weigh daily, avoid high sodium foods, ie: processed foods, can foods, MSG. NN notes SOB on call, weight is 375. ( 380 previous).  Understand CHF action plan. 3/1/21  Jonatan Keller will refrain from eating cans, frozen foods and increase eating fresh foods due to sodium content. Refills request sent to Dr Travis Schumacher and asked for a FU appt. Reviewed refill process. FU in  3 days.  Im              Time in discussion: 35 minutes

## 2021-03-01 NOTE — TELEPHONE ENCOUNTER
Message from the provider this morning;   Please call the patient to monitor symptoms,  If he is having shortness of breath, he may need to go to the ED. [facility] has CHF and could be in an exacerbation     Tc to the home number listed. No answer. The mobile number was called with the assistance of Dax Bueno. The pt answered the phone and he stated he had spoken to the [facility] MOHINDER [facility] RN. He stated he was able to get his medicine that was ordered today. The medication was reviewed with him. The pt stated he has SOB when he walks and when he lays down he has a cough, and he has sob. He is also coughing. The pt describes it as a dry cough, no phlegm that he has had for a while. The pt complained of also not sleeping. He can usually only sleep from 3am-9am. The pt was instructed per the provider to go to the ED. He was told to go to [address]. The pt stated he knew where the hospital was and did not need the address. The pt stated he will talk with his family and decide if he will go to the ED or not. He stated he really just wanted an appt wth the Dr. He can not get up early enough to call the appt line and get an appt, because he can not get to sleep until 3am and wakes up at 9am. The pt was told the NN had sent the Dr a message regarding his SOB, and had asked the provider about an appt,for the pt,  and the provider had sent this cbn nurse the message to call him and let him know, that the Dr is recommending him to go to the ED. The pt was told he could be having a worsening of his CHF, and would need to go to ED. The pt was told the registrar would contact him in schedule an appt after his Ed visit for 1 week. The pt verbalized understanding.  Tan Jones RN

## 2021-03-02 ENCOUNTER — TELEPHONE (OUTPATIENT)
Dept: FAMILY MEDICINE CLINIC | Age: 41
End: 2021-03-02

## 2021-03-02 NOTE — PROGRESS NOTES
3/1/21 NN reviewed sx/sx of exacerbation that would warrant a ED encounter. [address] verbalized understanding.

## 2021-03-08 ENCOUNTER — OFFICE VISIT (OUTPATIENT)
Dept: FAMILY MEDICINE CLINIC | Age: 41
End: 2021-03-08

## 2021-03-08 VITALS
TEMPERATURE: 98.4 F | WEIGHT: 315 LBS | HEART RATE: 69 BPM | BODY MASS INDEX: 49.44 KG/M2 | OXYGEN SATURATION: 94 % | HEIGHT: 67 IN | SYSTOLIC BLOOD PRESSURE: 119 MMHG | DIASTOLIC BLOOD PRESSURE: 64 MMHG

## 2021-03-08 DIAGNOSIS — K42.9 UMBILICAL HERNIA WITHOUT OBSTRUCTION AND WITHOUT GANGRENE: Primary | ICD-10-CM

## 2021-03-08 DIAGNOSIS — R06.02 SHORTNESS OF BREATH: ICD-10-CM

## 2021-03-08 DIAGNOSIS — E66.01 MORBID OBESITY (HCC): ICD-10-CM

## 2021-03-08 DIAGNOSIS — E11.9 DIABETES MELLITUS TYPE 2, DIET-CONTROLLED (HCC): ICD-10-CM

## 2021-03-08 DIAGNOSIS — F32.1 MAJOR DEPRESSIVE DISORDER, SINGLE EPISODE, MODERATE (HCC): ICD-10-CM

## 2021-03-08 DIAGNOSIS — Z71.89 COUNSELING AND COORDINATION OF CARE: Primary | ICD-10-CM

## 2021-03-08 DIAGNOSIS — G47.30 SLEEP APNEA, UNSPECIFIED TYPE: ICD-10-CM

## 2021-03-08 DIAGNOSIS — I50.9 ACUTE ON CHRONIC CONGESTIVE HEART FAILURE, UNSPECIFIED HEART FAILURE TYPE (HCC): ICD-10-CM

## 2021-03-08 LAB — GLUCOSE POC: NORMAL MG/DL

## 2021-03-08 PROCEDURE — 99080 SPECIAL REPORTS OR FORMS: CPT | Performed by: FAMILY MEDICINE

## 2021-03-08 PROCEDURE — 82962 GLUCOSE BLOOD TEST: CPT | Performed by: FAMILY MEDICINE

## 2021-03-08 PROCEDURE — 99213 OFFICE O/P EST LOW 20 MIN: CPT | Performed by: FAMILY MEDICINE

## 2021-03-08 RX ORDER — METFORMIN HYDROCHLORIDE 500 MG/1
TABLET ORAL
Qty: 180 TAB | Refills: 3 | Status: SHIPPED | OUTPATIENT
Start: 2021-03-08 | End: 2021-12-02 | Stop reason: SDUPTHER

## 2021-03-08 RX ORDER — CITALOPRAM 20 MG/1
TABLET, FILM COATED ORAL
Qty: 30 TAB | Refills: 5 | Status: SHIPPED | OUTPATIENT
Start: 2021-03-08 | End: 2021-12-02 | Stop reason: SDUPTHER

## 2021-03-08 RX ORDER — FUROSEMIDE 20 MG/1
20 TABLET ORAL 2 TIMES DAILY
Qty: 180 TAB | Refills: 3 | Status: SHIPPED | OUTPATIENT
Start: 2021-03-08 | End: 2021-12-02 | Stop reason: SDUPTHER

## 2021-03-08 RX ORDER — DIGOXIN 250 MCG
TABLET ORAL
Qty: 30 TAB | Refills: 5 | Status: SHIPPED | OUTPATIENT
Start: 2021-03-08 | End: 2021-12-02

## 2021-03-08 RX ORDER — AMLODIPINE BESYLATE 5 MG/1
5 TABLET ORAL DAILY
Qty: 90 TAB | Refills: 3 | Status: SHIPPED | OUTPATIENT
Start: 2021-03-08 | End: 2021-12-02 | Stop reason: SDUPTHER

## 2021-03-08 RX ORDER — ALBUTEROL SULFATE 90 UG/1
2 AEROSOL, METERED RESPIRATORY (INHALATION)
Qty: 1 INHALER | Refills: 3 | Status: SHIPPED | OUTPATIENT
Start: 2021-03-08 | End: 2021-12-02 | Stop reason: SDUPTHER

## 2021-03-08 RX ORDER — LISINOPRIL 5 MG/1
TABLET ORAL
Qty: 90 TAB | Refills: 3 | Status: SHIPPED | OUTPATIENT
Start: 2021-03-08 | End: 2021-12-02 | Stop reason: SDUPTHER

## 2021-03-08 RX ORDER — CARVEDILOL 3.12 MG/1
TABLET ORAL
Qty: 180 TAB | Refills: 3 | Status: SHIPPED | OUTPATIENT
Start: 2021-03-08 | End: 2021-12-02 | Stop reason: SDUPTHER

## 2021-03-08 NOTE — PROGRESS NOTES
Coordination of Care  1. Have you been to the ER, urgent care clinic since your last visit? Hospitalized since your last visit? No    2. Have you seen or consulted any other health care providers outside of the [address] [place] [address] since your last visit? Include any pap smears or colon screening. No    Does the patient need refills? YES    Learning Assessment Complete?  yes  Depression Screening complete in the past 12 months? yes     Results for orders placed or performed in visit on 03/08/21   AMB POC GLUCOSE BLOOD, BY GLUCOSE MONITORING DEVICE   Result Value Ref Range    Glucose POC nf 86 mg/dL

## 2021-03-08 NOTE — PROGRESS NOTES
Per Dr. Estefania Cole, patient needs assistance applying for a CPAP. OW has called Romy who stated she will call the sleep apnea specialist who treated patient last year to see why patient was told he did not qualify for the CPAP. Will FU.

## 2021-03-08 NOTE — PROGRESS NOTES
HISTORY  [facility] A Devika Truong is a 46 y.o. male. HPI     Patient states  He is trying to walk and has noticed with small amount of physical activity he becomes winded and has chest discomfort. So far has been walking a couple of days only. He has noticed he has gained a little bit more weight. He feels that during the winter he had not had the same opportunity to exercise. Patient states he went to see the sleep medicine doctor and has not been able to get the CPAP machine. Has a umbilical hernia bothering him but has not had a chance to see the surgeon    Review of Systems   HENT: Negative for ear pain, hearing loss and tinnitus. Eyes: Negative for blurred vision, double vision, photophobia and pain. Respiratory: Positive for shortness of breath. Cardiovascular: Positive for chest pain. Negative for orthopnea, leg swelling and PND. Gastrointestinal: Positive for abdominal pain. Negative for heartburn, nausea and vomiting. Genitourinary: Negative for dysuria, frequency, hematuria and urgency. Musculoskeletal: Negative for back pain, joint pain, myalgias and neck pain. Neurological: Negative for headaches. /64 (BP 1 Location: Right arm, BP Patient Position: Sitting)   Pulse 69   Temp 98.4 °F (36.9 °C) (Temporal)   Ht 5' 7.32\" (1.71 m)   Wt (!) 400 lb (181.4 kg)   SpO2 94%   BMI 62.05 kg/m²   Physical Exam  Constitutional:       General: He is not in acute distress. Appearance: He is not ill-appearing. HENT:      Head: Normocephalic. Right Ear: Tympanic membrane normal. There is no impacted cerumen. Left Ear: Tympanic membrane normal. There is no impacted cerumen. Nose: Nose normal.      Mouth/Throat:      Mouth: Mucous membranes are moist.      Pharynx: No oropharyngeal exudate or posterior oropharyngeal erythema. Eyes:      Pupils: Pupils are equal, round, and reactive to light. Neck:      Musculoskeletal: Normal range of motion. No neck rigidity or muscular tenderness. Cardiovascular:      Rate and Rhythm: Normal rate and regular rhythm. Pulses: Normal pulses. Heart sounds: No murmur. Pulmonary:      Effort: Pulmonary effort is normal.      Breath sounds: Normal breath sounds. No wheezing or rhonchi. Abdominal:      General: Bowel sounds are normal. There is no distension. Palpations: Abdomen is soft. There is no mass. Tenderness: There is no abdominal tenderness. Musculoskeletal: Normal range of motion. Skin:     General: Skin is warm. Coloration: Skin is not jaundiced or pale. Neurological:      Mental Status: He is alert. Mental status is at baseline. Cranial Nerves: No cranial nerve deficit. Sensory: No sensory deficit. ASSESSMENT and PLAN  Diagnoses and all orders for this visit:    1. Umbilical hernia without obstruction and without gangrene  -     REFERRAL TO GENERAL SURGERY    2. Diabetes mellitus type 2, diet-controlled ([facility])  -     AMB POC GLUCOSE BLOOD, BY GLUCOSE MONITORING DEVICE  -     metFORMIN (GLUCOPHAGE) 500 mg tablet; TAKE 1 TABLET BY MOUTH TWICE DAILY WITH MEALS    3. Acute on chronic congestive heart failure, unspecified heart failure type ([facility])  -     amLODIPine (NORVASC) 5 mg tablet; Take 1 Tab by mouth daily. -     carvediloL (COREG) 3.125 mg tablet; TAKE 1 TABLET BY MOUTH TWICE DAILY WITH MEALS  -     digoxin (LANOXIN) 0.25 mg tablet; Take 1 tablet by mouth once daily  -     furosemide (LASIX) 20 mg tablet; Take 1 Tab by mouth two (2) times a day. -     lisinopriL (PRINIVIL, ZESTRIL) 5 mg tablet; Take 1 tablet by mouth once daily    4. Major depressive disorder, single episode, moderate ([facility])  -     citalopram (CELEXA) 20 mg tablet; Take 1 tablet by mouth once daily    5. Shortness of breath  -     albuterol (PROVENTIL HFA, VENTOLIN HFA, PROAIR HFA) 90 mcg/actuation inhaler; Take 2 Puffs by inhalation every four (4) hours as needed for Wheezing or Cough.     6. Morbid obesity ([facility]  -     REFERRAL TO NUTRITION    7. Sleep apnea, unspecified type  -     REFERRAL TO SOCIAL WORK      46year old male with morbid obesity, umbilical hernia,  Controlled diabetes, shortness of breath, sleep apnea,  Needs medications refills. Wants to achieve weight loss, we will refer to nutrition for evaluation and management,  Refer to general surgery for umbilical hernia,  We will check with social work for help with c pap machine.   Follow up in 8 weeks

## 2021-03-17 ENCOUNTER — OFFICE VISIT (OUTPATIENT)
Dept: FAMILY MEDICINE CLINIC | Age: 41
End: 2021-03-17

## 2021-03-17 ENCOUNTER — TELEPHONE (OUTPATIENT)
Dept: FAMILY MEDICINE CLINIC | Age: 41
End: 2021-03-17

## 2021-03-17 DIAGNOSIS — Z71.89 COUNSELING AND COORDINATION OF CARE: Primary | ICD-10-CM

## 2021-03-17 PROCEDURE — 99080 SPECIAL REPORTS OR FORMS: CPT | Performed by: FAMILY MEDICINE

## 2021-03-17 NOTE — PROGRESS NOTES
Patient called to follow up on CPAP. Patient stated he did not receive any calls regarding this matter. Message will be forwarded to Romy. Patient was offered an appointment to receive the COVID-19 vaccine. Patient declined.

## 2021-03-18 ENCOUNTER — VIRTUAL VISIT (OUTPATIENT)
Dept: FAMILY MEDICINE CLINIC | Age: 41
End: 2021-03-18

## 2021-03-18 DIAGNOSIS — Z71.3 DIETARY COUNSELING AND SURVEILLANCE: Primary | ICD-10-CM

## 2021-03-18 PROCEDURE — 97802 MEDICAL NUTRITION INDIV IN: CPT

## 2021-03-18 NOTE — PROGRESS NOTES
Lito Cox consented to virtual/telephonic nutrition consultation. Video is declined. Pt identity verified using  and full name. Blu Marcos is interpreting for this appointment. DATE: 3/18/2021      REFERRING PHYSICIAN: Dr. Jignesh Lara  NAME: Lito Cox : 1980 AGE: 46 y.o. GENDER: male  REASON FOR VISIT: morbid obesity    ASSESSMENT:  Past Medical Hx: OC      LABS:   Lab Results   Component Value Date/Time    Hemoglobin A1c 6.4 (H) 10/19/2020 01:10 PM       MEDICATIONS/SUPPLEMENTS:     Prior to Admission medications    Medication Sig Start Date End Date Taking? Authorizing Provider   amLODIPine (NORVASC) 5 mg tablet Take 1 Tab by mouth daily. 3/8/21   Carmelita Akers MD   carvediloL (COREG) 3.125 mg tablet TAKE 1 TABLET BY MOUTH TWICE DAILY WITH MEALS 3/8/21   Wyatt Jiménez MD   citalopram (CELEXA) 20 mg tablet Take 1 tablet by mouth once daily 3/8/21   Carmelita Akers MD   digoxin [facility]) 0.25 mg tablet Take 1 tablet by mouth once daily 3/8/21   Carmelita Akers MD   furosemide (LASIX) 20 mg tablet Take 1 Tab by mouth two (2) times a day. 3/8/21   Carmelita Akers MD   lisinopriL (PRINIVIL, ZESTRIL) 5 mg tablet Take 1 tablet by mouth once daily 3/8/21   Carmelita Akers MD   metFORMIN (GLUCOPHAGE) 500 mg tablet TAKE 1 TABLET BY MOUTH TWICE DAILY WITH MEALS 3/8/21   Wyatt Jiménez MD   albuterol (PROVENTIL HFA, VENTOLIN HFA, PROAIR HFA) 90 mcg/actuation inhaler Take 2 Puffs by inhalation every four (4) hours as needed for Wheezing or Cough.  3/8/21   Carmelita Akers MD       FOOD ALLERGIES/INTOLERANCES: NA    ANTHROPOMETRICS:    Ht Readings from Last 1 Encounters:   21 5' 7.32\" (1.71 m)      Wt Readings from Last 1 Encounters:   21 (!) 400 lb (181.4 kg)      IBW:148 # +/- 10%  %IBW: 270 % +/- 10%    BMI: 62 Category: morbid obesity    Reported Wt Hx: maintained    Estimated Nutritional Needs: 3167-4385 kcals/day using ABW    Reported Diet Hx:  \"I am serving myself less food at each meal\"  Meals eaten at home, home prepared (sister does cooking)      24 Hour Diet Recall  Breakfast  Eggs, 2 fried  Spaghetti with cheese   Lunch  Cheese, fresh  Beans   Tortillas, 3  Chicken, 2 (fried)  Aloe water   Dinner  Asparagus  Rice  Chicken, fried  Salad, lettuce, cucumber, tomato   Snacks     Beverages  Coffee, black      Exercise/Physical Actvity:  Walks weather permitting. Environmental/Social:    I need a lot of motivation and someone to keep me accountable. Requests F/U in 2 weeks. NUTRITION INTERVENTION:  RD introduced CHO foods and how they affect BG. Discussed that some foods have added sugar and some have naturally occurring sugars. Emphasized that these foods need to be limited, portioned and always eaten in combination with PRO. Introduced MyPlate and discussed how it can help to ensure both balance and variety. Reviewed the food groups and what each group contributes to our bodies. PATIENT GOALS:  1) no fried foods  2) one CHO serving per meal, more veggies and PRO    Specific tips and techniques to facilitate compliance with above recommendations were provided and discussed. Pt was strongly encouraged to begin making necessary changes now, and re-visit the dietitian prn.             Lamar Gutierrez RD

## 2021-03-22 ENCOUNTER — OFFICE VISIT (OUTPATIENT)
Dept: SURGERY | Age: 41
End: 2021-03-22
Payer: SUBSIDIZED

## 2021-03-22 VITALS
BODY MASS INDEX: 49.44 KG/M2 | RESPIRATION RATE: 20 BRPM | HEIGHT: 67 IN | TEMPERATURE: 98.7 F | DIASTOLIC BLOOD PRESSURE: 80 MMHG | SYSTOLIC BLOOD PRESSURE: 126 MMHG | OXYGEN SATURATION: 95 % | WEIGHT: 315 LBS | HEART RATE: 91 BPM

## 2021-03-22 DIAGNOSIS — E11.9 DIABETES MELLITUS TYPE 2, DIET-CONTROLLED (HCC): ICD-10-CM

## 2021-03-22 DIAGNOSIS — R06.09 DYSPNEA ON EXERTION: ICD-10-CM

## 2021-03-22 DIAGNOSIS — E66.01 CLASS 3 SEVERE OBESITY DUE TO EXCESS CALORIES WITH BODY MASS INDEX (BMI) OF 60.0 TO 69.9 IN ADULT, UNSPECIFIED WHETHER SERIOUS COMORBIDITY PRESENT (HCC): ICD-10-CM

## 2021-03-22 DIAGNOSIS — K43.9 VENTRAL HERNIA WITHOUT OBSTRUCTION OR GANGRENE: Primary | ICD-10-CM

## 2021-03-22 PROCEDURE — 99203 OFFICE O/P NEW LOW 30 MIN: CPT | Performed by: SURGERY

## 2021-03-22 NOTE — PROGRESS NOTES
[facility] History and Physical    Chief Complaint: Hernia    History of Present Illness:      Robert Cartagena is a 46 y.o. male who has a 2 to 3-year history of what he thinks is an umbilical hernia. He feels that has increased in size over the last year. He has also gained a considerable amount of weight in the last year, although he is unsure exactly how much. He does have some pain with hernia. The pain comes and goes, however, at the worst it is moderate to severe in intensity. He has some associated nausea but no emesis. He has occasional constipation, but no obstipation. He has not had previous abdominal surgeries. He has dyspnea on exertion when walking short distances. He denies chest pain. He does not think he has had previous imaging of the hernia.     Past Medical History:   Diagnosis Date    Diabetes ([facility]     Heart failure ([facility]     Hypertension        Past Surgical History:   Procedure Laterality Date    HX ORTHOPAEDIC         Social History     Socioeconomic History    Marital status: SINGLE     Spouse name: Not on file    Number of children: Not on file    Years of education: Not on file    Highest education level: Not on file   Occupational History    Not on file   Social Needs    Financial resource strain: Not on file    Food insecurity     Worry: Not on file     Inability: Not on file    Transportation needs     Medical: Not on file     Non-medical: Not on file   Tobacco Use    Smoking status: Never Smoker    Smokeless tobacco: Never Used   Substance and Sexual Activity    Alcohol use: Not Currently    Drug use: Never    Sexual activity: Not on file   Lifestyle    Physical activity     Days per week: Not on file     Minutes per session: Not on file    Stress: Not on file   Relationships    Social connections     Talks on phone: Not on file     Gets together: Not on file     Attends [employer] service: Not on file     Active member of club or organization: Not on file     Attends meetings of clubs or organizations: Not on file     Relationship status: Not on file    Intimate partner violence     Fear of current or ex partner: Not on file     Emotionally abused: Not on file     Physically abused: Not on file     Forced sexual activity: Not on file   Other Topics Concern    Not on file   Social History Narrative    Not on file       No family history on file. Current Outpatient Medications:     amLODIPine (NORVASC) 5 mg tablet, Take 1 Tab by mouth daily. , Disp: 90 Tab, Rfl: 3    carvediloL (COREG) 3.125 mg tablet, TAKE 1 TABLET BY MOUTH TWICE DAILY WITH MEALS, Disp: 180 Tab, Rfl: 3    citalopram (CELEXA) 20 mg tablet, Take 1 tablet by mouth once daily, Disp: 30 Tab, Rfl: 5    digoxin (LANOXIN) 0.25 mg tablet, Take 1 tablet by mouth once daily, Disp: 30 Tab, Rfl: 5    furosemide (LASIX) 20 mg tablet, Take 1 Tab by mouth two (2) times a day., Disp: 180 Tab, Rfl: 3    lisinopriL (PRINIVIL, ZESTRIL) 5 mg tablet, Take 1 tablet by mouth once daily, Disp: 90 Tab, Rfl: 3    metFORMIN (GLUCOPHAGE) 500 mg tablet, TAKE 1 TABLET BY MOUTH TWICE DAILY WITH MEALS, Disp: 180 Tab, Rfl: 3    albuterol (PROVENTIL HFA, VENTOLIN HFA, PROAIR HFA) 90 mcg/actuation inhaler, Take 2 Puffs by inhalation every four (4) hours as needed for Wheezing or Cough. , Disp: 1 Inhaler, Rfl: 3    No Known Allergies    ROS   Constitutional: weight gain  Ears, Nose, Mouth, Throat, and Face: Negative  Respiratory: dyspnea on exertion  Cardiovascular: Negative  Gastrointestinal: as above  Genitourinary: Negative  Integument/Breast: Negative  Hematologic/Lymphatic: Negative  Behavioral/Psychiatric: Negative  Allergic/Immunologic: Negative      Physical Exam:     There were no vitals taken for this visit. General - alert and oriented, no apparent distress  HEENT - NC/AT. No scleral icterus  Pulm - CTAB, normal inspiratory effort  CV - RRR, no M/R/G  Abd - soft, ND, obese.  Large umbilical hernia containing bowel with overlying thinning of skin, unable to palpate hernia base  Ext - warm, well perfused, no edema  Lymphatics - no cervical, supraclavicular or inguinal adenopathy noted. Skin - supple, no rashes  Psychiatric - normal affect, good mood    Labs  none    Imaging  none        Assessment:     Rhys Rene is a 46 y.o. male with morbid obesity and a large umbilical hernia containing bowel that is only partially reducible. Recommendations:     1.   I had an extensive discussion with the patient regarding potential treatment for this hernia. Given his obesity, he is at high risk for hernia recurrence. I would prefer that he lose weight prior to consideration for any elective repair. In addition, he has not had any previous imaging of the hernia. I have ordered a CT scan to further evaluate. I will see him back in a few weeks to go over the results. If it looks like the hernia is lower risk for obstruction or strangulation, I will recommend that he lose weight before we attempt a hernia repair. 40 mins of time was spent with the patient of which > 50% of the time involved face-to-face counseling of the patient regarding the proposed treatment plan. Gail Perez MD  3/22/2021    CC:  DAWN Mosley

## 2021-03-22 NOTE — PROGRESS NOTES
1. Have you been to the ER, urgent care clinic since your last visit? Hospitalized since your last visit? No    2. Have you seen or consulted any other health care providers outside of the [address] [place] [address] since your last visit? Include any pap smears or colon screening. No     [facility] OF THE [facility]  Department assisted with communication during visit.

## 2021-03-31 ENCOUNTER — OFFICE VISIT (OUTPATIENT)
Dept: FAMILY MEDICINE CLINIC | Age: 41
End: 2021-03-31

## 2021-03-31 DIAGNOSIS — Z71.89 COUNSELING AND COORDINATION OF CARE: Primary | ICD-10-CM

## 2021-03-31 PROCEDURE — 99080 SPECIAL REPORTS OR FORMS: CPT | Performed by: PHYSICIAN ASSISTANT

## 2021-03-31 NOTE — PROGRESS NOTES
Called patient to inform the program that provided the furbished CPAP machines no longer exists. Also, OW offered COVID vaccine to patient. Patient stated he was not sure if he would get it yet and that will call back when he is ready.

## 2021-04-02 ENCOUNTER — HOSPITAL ENCOUNTER (OUTPATIENT)
Dept: CT IMAGING | Age: 41
Discharge: HOME | End: 2021-04-02
Attending: SURGERY
Payer: SUBSIDIZED

## 2021-04-02 DIAGNOSIS — K43.9 VENTRAL HERNIA WITHOUT OBSTRUCTION OR GANGRENE: ICD-10-CM

## 2021-04-02 LAB — CREAT BLD-MCNC: 0.7 MG/DL (ref 0.6–1.3)

## 2021-04-02 PROCEDURE — 82565 ASSAY OF CREATININE: CPT

## 2021-04-02 PROCEDURE — 74177 CT ABD & PELVIS W/CONTRAST: CPT

## 2021-04-02 PROCEDURE — 74011000636 HC RX REV CODE- 636: Performed by: RADIOLOGY

## 2021-04-02 RX ADMIN — IOPAMIDOL 100 ML: 755 INJECTION, SOLUTION INTRAVENOUS at 08:01

## 2021-05-19 ENCOUNTER — HOSPITAL ENCOUNTER (OUTPATIENT)
Dept: LAB | Age: 41
Discharge: HOME | End: 2021-05-19

## 2021-05-19 ENCOUNTER — OFFICE VISIT (OUTPATIENT)
Dept: FAMILY MEDICINE CLINIC | Age: 41
End: 2021-05-19

## 2021-05-19 VITALS
BODY MASS INDEX: 49.44 KG/M2 | TEMPERATURE: 98 F | OXYGEN SATURATION: 95 % | DIASTOLIC BLOOD PRESSURE: 76 MMHG | HEART RATE: 77 BPM | HEIGHT: 67 IN | WEIGHT: 315 LBS | SYSTOLIC BLOOD PRESSURE: 128 MMHG

## 2021-05-19 DIAGNOSIS — E11.9 TYPE 2 DIABETES MELLITUS WITHOUT COMPLICATION, WITHOUT LONG-TERM CURRENT USE OF INSULIN (HCC): ICD-10-CM

## 2021-05-19 DIAGNOSIS — M79.89 LEG SWELLING: Primary | ICD-10-CM

## 2021-05-19 DIAGNOSIS — K42.9 UMBILICAL HERNIA WITHOUT OBSTRUCTION AND WITHOUT GANGRENE: ICD-10-CM

## 2021-05-19 DIAGNOSIS — I50.9 ACUTE ON CHRONIC CONGESTIVE HEART FAILURE, UNSPECIFIED HEART FAILURE TYPE (HCC): ICD-10-CM

## 2021-05-19 DIAGNOSIS — E66.01 CLASS 3 SEVERE OBESITY WITH SERIOUS COMORBIDITY AND BODY MASS INDEX (BMI) OF 60.0 TO 69.9 IN ADULT, UNSPECIFIED OBESITY TYPE (HCC): ICD-10-CM

## 2021-05-19 LAB — GLUCOSE POC: NORMAL MG/DL

## 2021-05-19 PROCEDURE — 85025 COMPLETE CBC W/AUTO DIFF WBC: CPT

## 2021-05-19 PROCEDURE — 99213 OFFICE O/P EST LOW 20 MIN: CPT | Performed by: FAMILY MEDICINE

## 2021-05-19 PROCEDURE — 82962 GLUCOSE BLOOD TEST: CPT | Performed by: FAMILY MEDICINE

## 2021-05-19 PROCEDURE — 83036 HEMOGLOBIN GLYCOSYLATED A1C: CPT

## 2021-05-19 PROCEDURE — 80061 LIPID PANEL: CPT

## 2021-05-19 PROCEDURE — 80053 COMPREHEN METABOLIC PANEL: CPT

## 2021-05-19 RX ORDER — FUROSEMIDE 40 MG/1
TABLET ORAL
Qty: 60 TABLET | Refills: 0 | Status: SHIPPED | OUTPATIENT
Start: 2021-05-19 | End: 2021-08-26

## 2021-05-19 NOTE — PROGRESS NOTES
I have copied the provider's check out note here and have reviewed these items with the patient today:  Needs to see nutritionist     Increase lasix to 40 mg twice a day for 2 weeks, then back to 20 mg     Labs today   Refuse covid-19 vaccine today,  will think about it    An After Visit Summary was printed and reviewed with the patient, medications changes reviewed. Patient verbalized understanding.      Reagan Cai

## 2021-05-19 NOTE — PROGRESS NOTES
Coordination of Care  1. Have you been to the ER, urgent care clinic since your last visit? Hospitalized since your last visit? No    2. Have you seen or consulted any other health care providers outside of the [address] [place] [address] since your last visit? Include any pap smears or colon screening. No    Does the patient need refills? YES    Learning Assessment Complete?  yes  Depression Screening complete in the past 12 months? yes  Results for orders placed or performed in visit on 05/19/21   AMB POC GLUCOSE BLOOD, BY GLUCOSE MONITORING DEVICE   Result Value Ref Range    Glucose POC nf 130 MG/DL

## 2021-05-19 NOTE — PROGRESS NOTES
HISTORY  [facility] A Devon Oglesby is a 46 y.o. male. HPI  Patient states he is concerned with his weight. Patient states at home his weight decreased to 378 lbs last month. But has increased back to 401 lbs. Patient states he is careful with his diet. He ate some cheese yesterday. Was seen by general surgery for a umbilical hernia but is unable to have surgery until he loses weight. Review of Systems   HENT: Negative for ear pain, hearing loss and tinnitus. Eyes: Negative for blurred vision, double vision, photophobia and pain. Respiratory: Positive for shortness of breath. Cardiovascular: Positive for chest pain and leg swelling. Negative for orthopnea and PND. Gastrointestinal: Positive for abdominal pain. Negative for heartburn, nausea and vomiting. Genitourinary: Negative for dysuria, frequency, hematuria and urgency. Musculoskeletal: Negative for back pain, joint pain, myalgias and neck pain. Neurological: Negative for headaches. /76 (BP 1 Location: Left upper arm, BP Patient Position: Sitting)   Pulse 77   Temp 98 °F (36.7 °C) (Temporal)   Ht 5' 7.32\" (1.71 m)   Wt (!) 401 lb (181.9 kg)   SpO2 95%   BMI 62.20 kg/m²   Physical Exam  Constitutional:       General: He is not in acute distress. Appearance: He is not ill-appearing. HENT:      Head: Normocephalic. Right Ear: Tympanic membrane normal. There is no impacted cerumen. Left Ear: Tympanic membrane normal. There is no impacted cerumen. Nose: Nose normal.      Mouth/Throat:      Mouth: Mucous membranes are moist.      Pharynx: No oropharyngeal exudate or posterior oropharyngeal erythema. Eyes:      Pupils: Pupils are equal, round, and reactive to light. Cardiovascular:      Rate and Rhythm: Normal rate and regular rhythm. Pulses: Normal pulses. Heart sounds: No murmur heard.      Pulmonary:      Effort: Pulmonary effort is normal.      Breath sounds: Normal breath sounds. No wheezing or rhonchi. Abdominal:      General: Bowel sounds are normal. There is no distension. Palpations: Abdomen is soft. There is no mass. Tenderness: There is no abdominal tenderness. Musculoskeletal:         General: Swelling present. Normal range of motion. Cervical back: Normal range of motion. No rigidity. No muscular tenderness. Skin:     General: Skin is warm. Coloration: Skin is not jaundiced or pale. Neurological:      Mental Status: He is alert. Mental status is at baseline. Cranial Nerves: No cranial nerve deficit. Sensory: No sensory deficit. ASSESSMENT and PLAN  Diagnoses and all orders for this visit:    1. Leg swelling  -     furosemide (LASIX) 40 mg tablet; One tablet PO bid    2. Type 2 diabetes mellitus without complication, without long-term current use of insulin ([facility])  -     AMB POC GLUCOSE BLOOD, BY GLUCOSE MONITORING DEVICE  -     REFERRAL TO NUTRITION  -     CBC WITH AUTOMATED DIFF; Future  -     HEMOGLOBIN A1C WITH EAG; Future  -     LIPID PANEL; Future  -     METABOLIC PANEL, COMPREHENSIVE; Future    3. Acute on chronic congestive heart failure, unspecified heart failure type ([facility])    4. Class 3 severe obesity with serious comorbidity and body mass index (BMI) of 60.0 to 69.9 in adult, unspecified obesity type ([facility] [address]  -     REFERRAL TO NUTRITION    5. Umbilical hernia without obstruction and without gangrene        46year old with CHF and recent worsening of leg edema,  He is not short of breath,  We will increase lasix to 40 mg twice a day for 2 weeks,  If he starts experiencing shortness of breath, he is advised to go to the ED  We will check labs today  Patient has a umbilical hernia but needs to lose weight before he could receive surgical treatment.   We will refer to nutrition

## 2021-05-20 LAB
ALBUMIN SERPL-MCNC: 3.6 G/DL (ref 3.5–5)
ALBUMIN/GLOB SERPL: 0.8 {RATIO} (ref 1.1–2.2)
ALP SERPL-CCNC: 89 U/L (ref 45–117)
ALT SERPL-CCNC: 33 U/L (ref 12–78)
ANION GAP SERPL CALC-SCNC: 7 MMOL/L (ref 5–15)
AST SERPL-CCNC: 22 U/L (ref 15–37)
BASOPHILS # BLD: 0 K/UL (ref 0–0.1)
BASOPHILS NFR BLD: 1 % (ref 0–1)
BILIRUB SERPL-MCNC: 0.5 MG/DL (ref 0.2–1)
BUN SERPL-MCNC: 10 MG/DL (ref 6–20)
BUN/CREAT SERPL: 16 (ref 12–20)
CALCIUM SERPL-MCNC: 8.9 MG/DL (ref 8.5–10.1)
CHLORIDE SERPL-SCNC: 97 MMOL/L (ref 97–108)
CHOLEST SERPL-MCNC: 153 MG/DL
CO2 SERPL-SCNC: 32 MMOL/L (ref 21–32)
CREAT SERPL-MCNC: 0.64 MG/DL (ref 0.7–1.3)
DIFFERENTIAL METHOD BLD: ABNORMAL
EOSINOPHIL # BLD: 0.5 K/UL (ref 0–0.4)
EOSINOPHIL NFR BLD: 5 % (ref 0–7)
ERYTHROCYTE [DISTWIDTH] IN BLOOD BY AUTOMATED COUNT: 16.3 % (ref 11.5–14.5)
EST. AVERAGE GLUCOSE BLD GHB EST-MCNC: 148 MG/DL
GLOBULIN SER CALC-MCNC: 4.7 G/DL (ref 2–4)
GLUCOSE SERPL-MCNC: 109 MG/DL (ref 65–100)
HBA1C MFR BLD: 6.8 % (ref 4–5.6)
HCT VFR BLD AUTO: 44.5 % (ref 36.6–50.3)
HDLC SERPL-MCNC: 60 MG/DL
HDLC SERPL: 2.6 {RATIO} (ref 0–5)
HGB BLD-MCNC: 13.8 G/DL (ref 12.1–17)
IMM GRANULOCYTES # BLD AUTO: 0.1 K/UL (ref 0–0.04)
IMM GRANULOCYTES NFR BLD AUTO: 1 % (ref 0–0.5)
LDLC SERPL CALC-MCNC: 75.6 MG/DL (ref 0–100)
LYMPHOCYTES # BLD: 1.6 K/UL (ref 0.8–3.5)
LYMPHOCYTES NFR BLD: 20 % (ref 12–49)
MCH RBC QN AUTO: 27 PG (ref 26–34)
MCHC RBC AUTO-ENTMCNC: 31 G/DL (ref 30–36.5)
MCV RBC AUTO: 87.1 FL (ref 80–99)
MONOCYTES # BLD: 0.6 K/UL (ref 0–1)
MONOCYTES NFR BLD: 8 % (ref 5–13)
NEUTS SEG # BLD: 5.5 K/UL (ref 1.8–8)
NEUTS SEG NFR BLD: 65 % (ref 32–75)
NRBC # BLD: 0.02 K/UL (ref 0–0.01)
NRBC BLD-RTO: 0.2 PER 100 WBC
PLATELET # BLD AUTO: 277 K/UL (ref 150–400)
PMV BLD AUTO: 10.6 FL (ref 8.9–12.9)
POTASSIUM SERPL-SCNC: 4 MMOL/L (ref 3.5–5.1)
PROT SERPL-MCNC: 8.3 G/DL (ref 6.4–8.2)
RBC # BLD AUTO: 5.11 M/UL (ref 4.1–5.7)
SODIUM SERPL-SCNC: 136 MMOL/L (ref 136–145)
TRIGL SERPL-MCNC: 87 MG/DL (ref ?–150)
VLDLC SERPL CALC-MCNC: 17.4 MG/DL
WBC # BLD AUTO: 8.3 K/UL (ref 4.1–11.1)

## 2021-05-20 NOTE — PROGRESS NOTES
Diabetes is controlled  Normal blood count, kidney function, liver function  Electrolytes and cholesterol  Patient can be informed

## 2021-06-03 NOTE — PROGRESS NOTES
CMA made t/c to patient, verified name and  w/ patient. CMA informed patient per provider instructions that Diabetes is controlled   Normal blood count, kidney function, liver function   Electrolytes and cholesterol. This has been fully explained to the patient, who indicates understanding. Patient hadno more questions for CMA.

## 2021-06-10 ENCOUNTER — OFFICE VISIT (OUTPATIENT)
Dept: FAMILY MEDICINE CLINIC | Age: 41
End: 2021-06-10

## 2021-06-10 VITALS
SYSTOLIC BLOOD PRESSURE: 113 MMHG | DIASTOLIC BLOOD PRESSURE: 71 MMHG | OXYGEN SATURATION: 97 % | BODY MASS INDEX: 50.62 KG/M2 | HEIGHT: 66 IN | HEART RATE: 73 BPM | WEIGHT: 315 LBS | TEMPERATURE: 98 F

## 2021-06-10 DIAGNOSIS — I50.9 CHRONIC CONGESTIVE HEART FAILURE, UNSPECIFIED HEART FAILURE TYPE (HCC): Primary | ICD-10-CM

## 2021-06-10 DIAGNOSIS — Z71.3 DIETARY COUNSELING AND SURVEILLANCE: Primary | ICD-10-CM

## 2021-06-10 DIAGNOSIS — E66.01 CLASS 3 SEVERE OBESITY WITH SERIOUS COMORBIDITY AND BODY MASS INDEX (BMI) OF 60.0 TO 69.9 IN ADULT, UNSPECIFIED OBESITY TYPE (HCC): ICD-10-CM

## 2021-06-10 DIAGNOSIS — E11.9 TYPE 2 DIABETES MELLITUS WITHOUT COMPLICATION, WITHOUT LONG-TERM CURRENT USE OF INSULIN (HCC): ICD-10-CM

## 2021-06-10 LAB — GLUCOSE POC: NORMAL MG/DL

## 2021-06-10 PROCEDURE — 97802 MEDICAL NUTRITION INDIV IN: CPT

## 2021-06-10 PROCEDURE — 82962 GLUCOSE BLOOD TEST: CPT | Performed by: FAMILY MEDICINE

## 2021-06-10 PROCEDURE — 99213 OFFICE O/P EST LOW 20 MIN: CPT | Performed by: FAMILY MEDICINE

## 2021-06-10 NOTE — PROGRESS NOTES
Initial Appointment   : Sylvia Schilling    DATE: 6/10/2021      REFERRING PROVIDER: Carmelo Valenzuela MD  NAME: Gilbert Leong : 1980 AGE: 46 y.o. GENDER: male  REASON FOR VISIT: Weight Management    ASSESSMENT: Mr. Aby Bojorquez shares his biggest nutrition concern is weight loss. We discussed ways to lose weight while balancing blood sugar levels. He has already begun making changes to his eating and walking for more activity. Past Medical Hx: Chronic Congestive Heart Failure, Type 2 Diabetes Mellitus, Severe Obesity    LABS:   Lab Results   Component Value Date/Time    Hemoglobin A1c 6.8 (H) 2021 09:00 PM       MEDICATIONS/SUPPLEMENTS:   Prior to Admission medications    Medication Sig Start Date End Date Taking? Authorizing Provider   furosemide (LASIX) 40 mg tablet One tablet PO bid 21   Anne Gannon MD   amLODIPine (NORVASC) 5 mg tablet Take 1 Tab by mouth daily. 3/8/21   Christy Serrano MD   carvediloL (COREG) 3.125 mg tablet TAKE 1 TABLET BY MOUTH TWICE DAILY WITH MEALS 3/8/21   Anne Gannon MD   citalopram (CELEXA) 20 mg tablet Take 1 tablet by mouth once daily 3/8/21   Christy Serrano MD   digoxin [facility]) 0.25 mg tablet Take 1 tablet by mouth once daily 3/8/21   Christy Serrano MD   furosemide (LASIX) 20 mg tablet Take 1 Tab by mouth two (2) times a day. Patient not taking: Reported on 6/10/2021 3/8/21   Christy Serrano MD   lisinopriL (PRINIVIL, ZESTRIL) 5 mg tablet Take 1 tablet by mouth once daily 3/8/21   Christy Serrano MD   metFORMIN (GLUCOPHAGE) 500 mg tablet TAKE 1 TABLET BY MOUTH TWICE DAILY WITH MEALS 3/8/21   Anne Gannon MD   albuterol (PROVENTIL HFA, VENTOLIN HFA, PROAIR HFA) 90 mcg/actuation inhaler Take 2 Puffs by inhalation every four (4) hours as needed for Wheezing or Cough. 3/8/21   Christy Serrano MD       FOOD ALLERGIES/INTOLERANCES: No known food allergies. ANTHROPOMETRICS:    Ht Readings from Last 1 Encounters:   06/10/21 5' 6.14\" (1.68 m)      Wt Readings from Last 1 Encounters:   06/10/21 (!) 395 lb 12.8 oz (179.5 kg)      BMI: 63.61 Category: Obese Class III    Reported Wt Hx:  Wt Readings from Last 4 Encounters:   06/10/21 (!) 395 lb 12.8 oz (179.5 kg)   05/19/21 (!) 401 lb (181.9 kg)   03/22/21 (!) 400 lb (181.4 kg)   03/08/21 (!) 400 lb (181.4 kg)     Estimated Nutritional Needs: 3,180  kcals/day ([place] [facility] Jeor- 1.2 Activity Factor) - For weight maintenance                                                    2,180 kcals/day - for weight loss of ~2 lbs/week    Reported Diet Hx: 24 Hour Diet Recall  Breakfast  2 yogurts, 1 slice of toast, and a piece of ham   Lunch  ~1 cup of rice, chicken stewed with onions, peppers, tomatoes, 2 tortillas   Dinner  Crab soup with clear broth with two tortillas   Snacks  Drum [employer]   Beverages  1 cup of coffee black, 1 Metamucil     Exercise/Physical Actvity: He is walking 40 minutes - 1 hour of walking on working days 2- 3 days a week. Days off he walks for 2 hours 4 -5 days a week. Environmental/Social: His sister typically cooks meals for the family, sometimes he prepares foods for himself. NUTRITION INTERVENTION: Discussed how food impacts blood sugar levels, and reviewed which foods contain carbohydrates. Talked the importance of portion control when eating carbohydrates and pairing carbohydrates with a protein. We also talked about filling up on vegetables and the more colorful our vegetable choices the more nutrients we're getting. Also discussed physical activities role in weight loss. Shared Mi Juan Manuel Garcia.         PATIENT GOALS:  - Find a friend to walk with on the weekends  - 2 choices of carbs at meals   - 2 treats a week, all other snacks: 1/4 cup of nuts, 1 cheese stick with popcorn, vegetables, pickles    Specific tips and techniques to facilitate compliance with above recommendations were provided and discussed. Pt was strongly encouraged to begin making necessary changes now. Will follow-up with Tani Trinh in 2 months to review progress towards goals.           Erinn Miranda

## 2021-06-10 NOTE — PROGRESS NOTES
HISTORY  [facility] A Glen Morales is a 46 y.o. male. HPI  Patient states he is feeling well. He has been exercising and feels more energetic. He does it in the morning in his days off, and in the evening when he works. No other concerns. He is working on his diet but has not been able to communicate with nutritionist yet. Patient refuses COVID-19 vaccine. Patient would like to meet with nutritionist  Review of Systems   HENT: Negative for ear pain, hearing loss and tinnitus. Eyes: Negative for blurred vision, double vision, photophobia and pain. Respiratory: Negative for shortness of breath. Cardiovascular: Negative for chest pain, orthopnea, leg swelling and PND. Gastrointestinal: Negative for abdominal pain, heartburn, nausea and vomiting. Genitourinary: Negative for dysuria, frequency, hematuria and urgency. Musculoskeletal: Negative for back pain, joint pain, myalgias and neck pain. Neurological: Negative for headaches. /71 (BP 1 Location: Left arm, BP Patient Position: Sitting)   Pulse 73   Temp 98 °F (36.7 °C)   Ht 5' 6.14\" (1.68 m)   Wt (!) 395 lb 12.8 oz (179.5 kg)   SpO2 97%   BMI 63.61 kg/m²   Physical Exam  Constitutional:       General: He is not in acute distress. Appearance: He is not ill-appearing. HENT:      Head: Normocephalic. Right Ear: Tympanic membrane normal. There is no impacted cerumen. Left Ear: Tympanic membrane normal. There is no impacted cerumen. Nose: Nose normal.      Mouth/Throat:      Mouth: Mucous membranes are moist.      Pharynx: No oropharyngeal exudate or posterior oropharyngeal erythema. Eyes:      Pupils: Pupils are equal, round, and reactive to light. Cardiovascular:      Rate and Rhythm: Normal rate and regular rhythm. Pulses: Normal pulses. Heart sounds: No murmur heard. Pulmonary:      Effort: Pulmonary effort is normal.      Breath sounds: Normal breath sounds.  No wheezing or rhonchi. Abdominal:      General: Bowel sounds are normal. There is no distension. Palpations: Abdomen is soft. There is no mass. Tenderness: There is no abdominal tenderness. Musculoskeletal:         General: Normal range of motion. Cervical back: Normal range of motion. No rigidity. No muscular tenderness. Skin:     General: Skin is warm. Coloration: Skin is not jaundiced or pale. Neurological:      Mental Status: He is alert. Mental status is at baseline. Cranial Nerves: No cranial nerve deficit. Sensory: No sensory deficit. ASSESSMENT and PLAN  Diagnoses and all orders for this visit:    1. Chronic congestive heart failure, unspecified heart failure type ([facility] [address]  -     REFERRAL TO CARDIOLOGY    2. Type 2 diabetes mellitus without complication, without long-term current use of insulin ([facility])  -     AMB POC GLUCOSE BLOOD, BY GLUCOSE MONITORING DEVICE    3. Class 3 severe obesity with serious comorbidity and body mass index (BMI) of 60.0 to 69.9 in adult, unspecified obesity type ([facility] [address]  -     REFERRAL TO NUTRITION      46year old male with CHF, severe obesity and diabetes,  He has been working hard on diet and exercise,  Is starting to lose weight, feels better. We will refer to nutrition so that a plan can be established. Needs to update his care card.   Follow up in 8 weeks

## 2021-06-10 NOTE — PROGRESS NOTES
Coordination of Care  1. Have you been to the ER, urgent care clinic since your last visit? Hospitalized since your last visit? No    2. Have you seen or consulted any other health care providers outside of the [address] [place] [address] since your last visit? Include any pap smears or colon screening. No    Does the patient need refills? YES    Learning Assessment Complete?  yes  Depression Screening complete in the past 12 months? yes     Results for orders placed or performed in visit on 06/10/21   AMB POC GLUCOSE BLOOD, BY GLUCOSE MONITORING DEVICE   Result Value Ref Range    Glucose POC nf-101 MG/DL

## 2021-06-16 ENCOUNTER — VIRTUAL VISIT (OUTPATIENT)
Dept: FAMILY MEDICINE CLINIC | Age: 41
End: 2021-06-16

## 2021-06-16 DIAGNOSIS — Z71.89 COUNSELING AND COORDINATION OF CARE: Primary | ICD-10-CM

## 2021-06-16 NOTE — PROGRESS NOTES
Patent has been screened and scheduled for a f2f appointment to complete AN financial screening process.

## 2021-06-17 ENCOUNTER — OFFICE VISIT (OUTPATIENT)
Dept: FAMILY MEDICINE CLINIC | Age: 41
End: 2021-06-17

## 2021-06-17 DIAGNOSIS — Z71.89 COUNSELING AND COORDINATION OF CARE: Primary | ICD-10-CM

## 2021-06-17 PROCEDURE — 99080 SPECIAL REPORTS OR FORMS: CPT | Performed by: PHYSICIAN ASSISTANT

## 2021-07-27 ENCOUNTER — OFFICE VISIT (OUTPATIENT)
Dept: SURGERY | Age: 41
End: 2021-07-27

## 2021-07-27 VITALS
TEMPERATURE: 98.5 F | HEART RATE: 74 BPM | RESPIRATION RATE: 20 BRPM | WEIGHT: 315 LBS | HEIGHT: 66 IN | SYSTOLIC BLOOD PRESSURE: 108 MMHG | DIASTOLIC BLOOD PRESSURE: 66 MMHG | BODY MASS INDEX: 50.62 KG/M2

## 2021-07-27 DIAGNOSIS — E66.01 MORBID OBESITY (HCC): ICD-10-CM

## 2021-07-27 DIAGNOSIS — I50.9 CHRONIC CONGESTIVE HEART FAILURE, UNSPECIFIED HEART FAILURE TYPE (HCC): ICD-10-CM

## 2021-07-27 DIAGNOSIS — G47.33 OSA (OBSTRUCTIVE SLEEP APNEA): ICD-10-CM

## 2021-07-27 DIAGNOSIS — K42.9 UMBILICAL HERNIA WITHOUT OBSTRUCTION AND WITHOUT GANGRENE: Primary | ICD-10-CM

## 2021-07-27 PROCEDURE — 99203 OFFICE O/P NEW LOW 30 MIN: CPT | Performed by: SURGERY

## 2021-07-27 NOTE — PROGRESS NOTES
1. Have you been to the ER, urgent care clinic since your last visit? Hospitalized since your last visit? No    2. Have you seen or consulted any other health care providers outside of the [address] [place] [address] since your last visit? Include any pap smears or colon screening. Radha Henriquez  line ID [id number] assisted with translation at today's visit.

## 2021-07-27 NOTE — PROGRESS NOTES
[facility] History and Physical    Chief Complaint: hernia    History of Present Illness:      Tal Cesar is a 46 y.o. male who has a history of bulge in his umbilicus for the last year. He thinks this is increasing in size. It has become increasingly painful as well. He does not notice any other associated symptoms such as nausea or vomiting. He does have constipation. He has not had any previous abdominal surgeries. He was noted to have an umbilical hernia on a recent CT containing fat. Past Medical History:   Diagnosis Date    Depression     Diabetes ([facility] [address]     Heart failure ([facility] [address]     Hypertension     Sleep apnea        Past Surgical History:   Procedure Laterality Date    HX ORTHOPAEDIC         Social History     Socioeconomic History    Marital status: SINGLE     Spouse name: Not on file    Number of children: Not on file    Years of education: Not on file    Highest education level: Not on file   Occupational History    Not on file   Tobacco Use    Smoking status: Never Smoker    Smokeless tobacco: Never Used   Vaping Use    Vaping Use: Never used   Substance and Sexual Activity    Alcohol use: Not Currently    Drug use: Never    Sexual activity: Not on file   Other Topics Concern    Not on file   Social History Narrative    Not on file     Social Determinants of Health     Financial Resource Strain:     Difficulty of Paying Living Expenses:    Food Insecurity:     Worried About Running Out of Food in the Last Year:     Ran Out of Food in the Last Year:    Transportation Needs:     Lack of Transportation (Medical):      Lack of Transportation (Non-Medical):    Physical Activity:     Days of Exercise per Week:     Minutes of Exercise per Session:    Stress:     Feeling of Stress :    Social Connections:     Frequency of Communication with Friends and Family:     Frequency of Social Gatherings with Friends and Family:     Attends Evangelical Services:     Active Member of Clubs or Organizations:     Attends Club or Organization Meetings:     Marital Status:    Intimate Partner Violence:     Fear of Current or Ex-Partner:     Emotionally Abused:     Physically Abused:     Sexually Abused:        No family history on file. Current Outpatient Medications:     furosemide (LASIX) 40 mg tablet, One tablet PO bid, Disp: 60 Tablet, Rfl: 0    amLODIPine (NORVASC) 5 mg tablet, Take 1 Tab by mouth daily. , Disp: 90 Tab, Rfl: 3    carvediloL (COREG) 3.125 mg tablet, TAKE 1 TABLET BY MOUTH TWICE DAILY WITH MEALS, Disp: 180 Tab, Rfl: 3    citalopram (CELEXA) 20 mg tablet, Take 1 tablet by mouth once daily, Disp: 30 Tab, Rfl: 5    digoxin (LANOXIN) 0.25 mg tablet, Take 1 tablet by mouth once daily, Disp: 30 Tab, Rfl: 5    lisinopriL (PRINIVIL, ZESTRIL) 5 mg tablet, Take 1 tablet by mouth once daily, Disp: 90 Tab, Rfl: 3    metFORMIN (GLUCOPHAGE) 500 mg tablet, TAKE 1 TABLET BY MOUTH TWICE DAILY WITH MEALS, Disp: 180 Tab, Rfl: 3    albuterol (PROVENTIL HFA, VENTOLIN HFA, PROAIR HFA) 90 mcg/actuation inhaler, Take 2 Puffs by inhalation every four (4) hours as needed for Wheezing or Cough. , Disp: 1 Inhaler, Rfl: 3    furosemide (LASIX) 20 mg tablet, Take 1 Tab by mouth two (2) times a day.  (Patient not taking: Reported on 6/10/2021), Disp: 180 Tab, Rfl: 3    No Known Allergies    ROS   Constitutional: weight gain  Ears, Nose, Mouth, Throat, and Face: Negative  Respiratory: dyspena on exertion and occasional at rest  Cardiovascular: Negative  Gastrointestinal: as above  Genitourinary: Negative  Integument/Breast: Negative  Hematologic/Lymphatic: Negative  Behavioral/Psychiatric: Negative  Allergic/Immunologic: Negative      Physical Exam:     Visit Vitals  /66 (BP 1 Location: Left upper arm, BP Patient Position: Sitting, BP Cuff Size: Adult)   Pulse 74   Temp 98.5 °F (36.9 °C) (Oral)   Resp 20   Ht 5' 6.14\" (1.68 m)   Wt (!) 395 lb (179.2 kg) BMI 63.49 kg/m²       General - alert and oriented, no apparent distress, morbidly obese  HEENT - NC/AT. No scleral icterus  Pulm - CTAB, increased inspiratory effort and work of breathing  CV - RRR, no M/R/G  Abd - soft, obese NT, ND. Large umbilical hernia present, partially reducible  Ext - warm, well perfused, no edema  Skin - supple, no rashes  Psychiatric - normal affect, good mood      Imaging  CT A/P 3/22/21:  FINDINGS:   LOWER THORAX: No significant abnormality in the incidentally imaged lower chest.  LIVER: Diffusely hypodense with sparing about the gallbladder fossa. BILIARY TREE: Gallbladder is within normal limits. CBD is not dilated. SPLEEN: within normal limits. PANCREAS: No mass or ductal dilatation. ADRENALS: Unremarkable. KIDNEYS: No mass, calculus, or hydronephrosis. STOMACH: Unremarkable. SMALL BOWEL: No dilatation or wall thickening. COLON: No dilatation or wall thickening. APPENDIX: Unremarkable. PERITONEUM: No ascites or pneumoperitoneum. RETROPERITONEUM: No lymphadenopathy or aortic aneurysm. REPRODUCTIVE ORGANS: The prostate and seminal vesicles appear unremarkable. URINARY BLADDER: No mass or calculus. BONES: No destructive bone lesion. ABDOMINAL WALL: There is an anterior midline umbilical abdominal wall hernia  attending fat through a 4.6 ML x 5.3 CC cm fascial defect with hernia sac  measuring 13.5 ML by 8.5 AP by 15.2 CC cm. There is a small fat-containing right  inguinal hernia. No mass or adenopathy. ADDITIONAL COMMENTS: N/A     IMPRESSION  Fat-containing umbilical abdominal wall hernia as detailed above. Hepatic steatosis. I have reviewed and agree with all of the pertinent images    Assessment:     Scott Spicer is a 46 y.o. male with a large symptomatic umbilical hernia. The patient is morbidly obese. He has a history of CHF, and it is unclear whether he has a cardiologist that he is actively following with.   He has obvious dyspnea while sitting in the exam room. Recommendations:     1.   I had a long discussion with the patient regarding his umbilical hernia and possible treatments. I discussed that the hernia would not get better without surgical intervention and may increase in size over time. However, due to the patient's body habitus, he is at very high likelihood for complications after surgery as well as for hernia recurrence. In addition, he has a diagnosis of congestive heart failure. He would need cardiology evaluation prior to any surgical intervention. At this time, I recommended observation of the hernia. If he can lose weight, that will increase the likelihood that surgical repair would be successful. I will see him back in 3 months to reevaluate. 38 mins of time was spent with the patient of which > 50% of the time involved face-to-face counseling of the patient regarding the proposed treatment plan. Britney Still MD  7/27/2021    CC:  DAWN Desir

## 2021-08-26 DIAGNOSIS — M79.89 LEG SWELLING: ICD-10-CM

## 2021-08-26 RX ORDER — FUROSEMIDE 40 MG/1
TABLET ORAL
Qty: 60 TABLET | Refills: 0 | Status: SHIPPED | OUTPATIENT
Start: 2021-08-26 | End: 2021-12-02

## 2021-10-20 ENCOUNTER — TELEPHONE (OUTPATIENT)
Dept: SURGERY | Age: 41
End: 2021-10-20

## 2021-11-03 ENCOUNTER — TELEPHONE (OUTPATIENT)
Dept: FAMILY MEDICINE CLINIC | Age: 41
End: 2021-11-03

## 2021-11-03 ENCOUNTER — TELEPHONE (OUTPATIENT)
Dept: SURGERY | Age: 41
End: 2021-11-03

## 2021-11-03 NOTE — TELEPHONE ENCOUNTER
Carmel called in regards to PT. Needed to cancel the Nov. 15th appointment as PT is currently hospitalized in [place] due to [place]. Carmel stated that PT is not sure when he will be able to R/S, but will call back to do so.

## 2021-12-02 ENCOUNTER — HOSPITAL ENCOUNTER (OUTPATIENT)
Dept: LAB | Age: 41
Discharge: HOME | End: 2021-12-02

## 2021-12-02 ENCOUNTER — OFFICE VISIT (OUTPATIENT)
Dept: FAMILY MEDICINE CLINIC | Age: 41
End: 2021-12-02

## 2021-12-02 VITALS
WEIGHT: 315 LBS | BODY MASS INDEX: 47.74 KG/M2 | HEART RATE: 77 BPM | TEMPERATURE: 97.7 F | SYSTOLIC BLOOD PRESSURE: 125 MMHG | DIASTOLIC BLOOD PRESSURE: 76 MMHG | HEIGHT: 68 IN | OXYGEN SATURATION: 94 %

## 2021-12-02 DIAGNOSIS — E11.9 TYPE 2 DIABETES MELLITUS WITHOUT COMPLICATION, WITHOUT LONG-TERM CURRENT USE OF INSULIN (HCC): ICD-10-CM

## 2021-12-02 DIAGNOSIS — R06.02 SHORTNESS OF BREATH: ICD-10-CM

## 2021-12-02 DIAGNOSIS — I10 HYPERTENSION, UNSPECIFIED TYPE: ICD-10-CM

## 2021-12-02 DIAGNOSIS — I51.7 CARDIOMEGALY: ICD-10-CM

## 2021-12-02 DIAGNOSIS — E11.9 DIABETES MELLITUS TYPE 2, DIET-CONTROLLED (HCC): ICD-10-CM

## 2021-12-02 DIAGNOSIS — M54.50 ACUTE RIGHT-SIDED LOW BACK PAIN WITHOUT SCIATICA: ICD-10-CM

## 2021-12-02 DIAGNOSIS — F32.1 MAJOR DEPRESSIVE DISORDER, SINGLE EPISODE, MODERATE (HCC): ICD-10-CM

## 2021-12-02 DIAGNOSIS — R06.09 EXERTIONAL DYSPNEA: Primary | ICD-10-CM

## 2021-12-02 DIAGNOSIS — K42.9 UMBILICAL HERNIA WITHOUT OBSTRUCTION AND WITHOUT GANGRENE: ICD-10-CM

## 2021-12-02 LAB — GLUCOSE POC: NORMAL MG/DL

## 2021-12-02 PROCEDURE — 80053 COMPREHEN METABOLIC PANEL: CPT

## 2021-12-02 PROCEDURE — 83036 HEMOGLOBIN GLYCOSYLATED A1C: CPT

## 2021-12-02 PROCEDURE — 82043 UR ALBUMIN QUANTITATIVE: CPT

## 2021-12-02 PROCEDURE — 99213 OFFICE O/P EST LOW 20 MIN: CPT | Performed by: FAMILY MEDICINE

## 2021-12-02 PROCEDURE — 82962 GLUCOSE BLOOD TEST: CPT | Performed by: FAMILY MEDICINE

## 2021-12-02 PROCEDURE — 80061 LIPID PANEL: CPT

## 2021-12-02 PROCEDURE — 85025 COMPLETE CBC W/AUTO DIFF WBC: CPT

## 2021-12-02 RX ORDER — ALBUTEROL SULFATE 90 UG/1
2 AEROSOL, METERED RESPIRATORY (INHALATION)
Qty: 1 EACH | Refills: 5 | Status: SHIPPED | OUTPATIENT
Start: 2021-12-02

## 2021-12-02 RX ORDER — CARVEDILOL 3.12 MG/1
TABLET ORAL
Qty: 180 TABLET | Refills: 3 | Status: SHIPPED | OUTPATIENT
Start: 2021-12-02

## 2021-12-02 RX ORDER — FUROSEMIDE 20 MG/1
20 TABLET ORAL 2 TIMES DAILY
Qty: 180 TABLET | Refills: 3 | Status: SHIPPED | OUTPATIENT
Start: 2021-12-02

## 2021-12-02 RX ORDER — DICLOFENAC SODIUM 10 MG/G
4 GEL TOPICAL 3 TIMES DAILY
Qty: 100 G | Refills: 2 | Status: SHIPPED | OUTPATIENT
Start: 2021-12-02

## 2021-12-02 RX ORDER — CITALOPRAM 20 MG/1
TABLET, FILM COATED ORAL
Qty: 30 TABLET | Refills: 5 | Status: SHIPPED | OUTPATIENT
Start: 2021-12-02

## 2021-12-02 RX ORDER — AMLODIPINE BESYLATE 5 MG/1
5 TABLET ORAL DAILY
Qty: 90 TABLET | Refills: 3 | Status: SHIPPED | OUTPATIENT
Start: 2021-12-02

## 2021-12-02 RX ORDER — LISINOPRIL 5 MG/1
TABLET ORAL
Qty: 90 TABLET | Refills: 3 | Status: SHIPPED | OUTPATIENT
Start: 2021-12-02

## 2021-12-02 RX ORDER — METFORMIN HYDROCHLORIDE 500 MG/1
TABLET ORAL
Qty: 180 TABLET | Refills: 3 | Status: SHIPPED | OUTPATIENT
Start: 2021-12-02

## 2021-12-02 NOTE — PROGRESS NOTES
HISTORY  [facility] A Sofya Quiñones is a 46 y.o. male. HPI  Patient states he is feeling well. However, he is having some bone pains,  He right forearm,  The shoulder, the lower back and the knees. For the past 2 weeks he has had right sided lumbar pain. 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 It gets worse when he is home, lying down. He has not taken any medication for the problem. Tries to stay well hydrated. Last year was seen by cardiologist and was informed his heart function was normal.   He remains short of breath with mild to moderate exertion. And chest pains with exertion. He was evaluated by general surgery for his umbilical hernia. He will need cardiology clearance for this surgery. Glucose levels is normal, he is following a healthy diet. Review of Systems   HENT: Negative for ear pain, hearing loss and tinnitus. Eyes: Negative for blurred vision, double vision, photophobia and pain. Respiratory: Positive for shortness of breath. Cardiovascular: Negative for chest pain, orthopnea, leg swelling and PND. Gastrointestinal: Positive for abdominal pain. Negative for heartburn, nausea and vomiting. Painful umbilical hernia   Genitourinary: Negative for dysuria, frequency, hematuria and urgency. Musculoskeletal: Positive for back pain. Negative for joint pain, myalgias and neck pain. Neurological: Negative for headaches. /76 (BP 1 Location: Right arm, BP Patient Position: Sitting)   Pulse 77   Temp 97.7 °F (36.5 °C) (Temporal)   Ht 5' 7.87\" (1.724 m)   Wt (!) 397 lb (180.1 kg)   SpO2 94%   BMI 60.59 kg/m²   Physical Exam  Constitutional:       General: He is not in acute distress. Appearance: He is not ill-appearing. HENT:      Head: Normocephalic. Right Ear: Tympanic membrane normal. There is no impacted cerumen. Left Ear: Tympanic membrane normal. There is no impacted cerumen.       Nose: Nose normal.      Mouth/Throat: Mouth: Mucous membranes are moist.      Pharynx: No oropharyngeal exudate or posterior oropharyngeal erythema. Eyes:      Pupils: Pupils are equal, round, and reactive to light. Cardiovascular:      Rate and Rhythm: Normal rate and regular rhythm. Pulses: Normal pulses. Heart sounds: No murmur heard. Pulmonary:      Effort: Pulmonary effort is normal.      Breath sounds: Normal breath sounds. No wheezing or rhonchi. Abdominal:      General: Bowel sounds are normal. There is no distension. Palpations: Abdomen is soft. There is no mass. Tenderness: There is no abdominal tenderness. Hernia: A hernia is present. Comments: Large umbilical hernia   Musculoskeletal:         General: Normal range of motion. Cervical back: Normal range of motion. No rigidity. No muscular tenderness. Skin:     General: Skin is warm. Coloration: Skin is not jaundiced or pale. Neurological:      Mental Status: He is alert. Mental status is at baseline. Cranial Nerves: No cranial nerve deficit. Sensory: No sensory deficit. ASSESSMENT and PLAN  Diagnoses and all orders for this visit:    1. Exertional dyspnea  -     ECHO ADULT COMPLETE; Future  -     REFERRAL TO CARDIOLOGY    2. Type 2 diabetes mellitus without complication, without long-term current use of insulin ([facility])  -     AMB POC GLUCOSE BLOOD, BY GLUCOSE MONITORING DEVICE  -     METABOLIC PANEL, COMPREHENSIVE; Future  -     MICROALBUMIN, UR, RAND W/ MICROALB/CREAT RATIO; Future  -     CBC WITH AUTOMATED DIFF; Future  -     HEMOGLOBIN A1C WITH EAG; Future  -     LIPID PANEL; Future    3. Acute right-sided low back pain without sciatica  -     diclofenac (VOLTAREN) 1 % gel; Apply 4 g to affected area three (3) times daily. Right lumbar area x 10 days    4. Diabetes mellitus type 2, diet-controlled (HCC)  -     metFORMIN (GLUCOPHAGE) 500 mg tablet; TAKE 1 TABLET BY MOUTH TWICE DAILY WITH MEALS    5.  Major depressive disorder, single episode, moderate (HCC)  -     citalopram (CELEXA) 20 mg tablet; Take 1 tablet by mouth once daily    6. Shortness of breath  -     albuterol (PROVENTIL HFA, VENTOLIN HFA, PROAIR HFA) 90 mcg/actuation inhaler; Take 2 Puffs by inhalation every four (4) hours as needed for Wheezing or Cough. 7. Hypertension, unspecified type  -     lisinopriL (PRINIVIL, ZESTRIL) 5 mg tablet; Take 1 tablet by mouth once daily  -     furosemide (LASIX) 20 mg tablet; Take 1 Tablet by mouth two (2) times a day. -     carvediloL (COREG) 3.125 mg tablet; TAKE 1 TABLET BY MOUTH TWICE DAILY WITH MEALS  -     amLODIPine (NORVASC) 5 mg tablet; Take 1 Tablet by mouth daily. 8. Cardiomegaly  -     REFERRAL TO CARDIOLOGY    9. Umbilical hernia without obstruction and without gangrene      46 year with type 2 diabetes, exertional dyspnea, cardiomegaly, hypertension, and umbilical hernia, who needs refills on medications. He has been evaluated fur the umbilical hernia and requires cardiology clearance for the procedure, we will order echocardiogram and labs today.   We will refer back to cardiology

## 2021-12-02 NOTE — PROGRESS NOTES
Coordination of Care  1. Have you been to the ER, urgent care clinic since your last visit? Hospitalized since your last visit? Yes When: [facility] 10/2021, SOB    2. Have you seen or consulted any other health care providers outside of the [address] [place] [address] since your last visit? Include any pap smears or colon screening. No    Does the patient need refills? YES    Learning Assessment Complete?  yes  Depression Screening complete in the past 12 months? yes  Results for orders placed or performed in visit on 12/02/21   AMB POC GLUCOSE BLOOD, BY GLUCOSE MONITORING DEVICE   Result Value Ref Range    Glucose  nf MG/DL

## 2021-12-02 NOTE — PROGRESS NOTES
I have printed AVS and reviewed it with patient today. I assisted the patient with scheduling the ECHO per the order. I printed and gave the patient the GoodRx coupons.  # [id number] with [facility] services assisted. Patient verbalized understanding.  Emiliano Jeff RN

## 2021-12-03 LAB
ALBUMIN SERPL-MCNC: 3.6 G/DL (ref 3.5–5)
ALBUMIN/GLOB SERPL: 0.9 {RATIO} (ref 1.1–2.2)
ALP SERPL-CCNC: 64 U/L (ref 45–117)
ALT SERPL-CCNC: 46 U/L (ref 12–78)
ANION GAP SERPL CALC-SCNC: 7 MMOL/L (ref 5–15)
AST SERPL-CCNC: 24 U/L (ref 15–37)
BASOPHILS # BLD: 0 K/UL (ref 0–0.1)
BASOPHILS NFR BLD: 0 % (ref 0–1)
BILIRUB SERPL-MCNC: 0.5 MG/DL (ref 0.2–1)
BUN SERPL-MCNC: 10 MG/DL (ref 6–20)
BUN/CREAT SERPL: 18 (ref 12–20)
CALCIUM SERPL-MCNC: 9.3 MG/DL (ref 8.5–10.1)
CHLORIDE SERPL-SCNC: 100 MMOL/L (ref 97–108)
CHOLEST SERPL-MCNC: 142 MG/DL
CO2 SERPL-SCNC: 31 MMOL/L (ref 21–32)
CREAT SERPL-MCNC: 0.57 MG/DL (ref 0.7–1.3)
CREAT UR-MCNC: 157 MG/DL
DIFFERENTIAL METHOD BLD: ABNORMAL
EOSINOPHIL # BLD: 0.7 K/UL (ref 0–0.4)
EOSINOPHIL NFR BLD: 9 % (ref 0–7)
ERYTHROCYTE [DISTWIDTH] IN BLOOD BY AUTOMATED COUNT: 16.4 % (ref 11.5–14.5)
EST. AVERAGE GLUCOSE BLD GHB EST-MCNC: 154 MG/DL
GLOBULIN SER CALC-MCNC: 4 G/DL (ref 2–4)
GLUCOSE SERPL-MCNC: 112 MG/DL (ref 65–100)
HBA1C MFR BLD: 7 % (ref 4–5.6)
HCT VFR BLD AUTO: 45 % (ref 36.6–50.3)
HDLC SERPL-MCNC: 59 MG/DL
HDLC SERPL: 2.4 {RATIO} (ref 0–5)
HGB BLD-MCNC: 13.9 G/DL (ref 12.1–17)
IMM GRANULOCYTES # BLD AUTO: 0 K/UL (ref 0–0.04)
IMM GRANULOCYTES NFR BLD AUTO: 0 % (ref 0–0.5)
LDLC SERPL CALC-MCNC: 66 MG/DL (ref 0–100)
LYMPHOCYTES # BLD: 1.8 K/UL (ref 0.8–3.5)
LYMPHOCYTES NFR BLD: 23 % (ref 12–49)
MCH RBC QN AUTO: 27.8 PG (ref 26–34)
MCHC RBC AUTO-ENTMCNC: 30.9 G/DL (ref 30–36.5)
MCV RBC AUTO: 90 FL (ref 80–99)
MICROALBUMIN UR-MCNC: 2.7 MG/DL
MICROALBUMIN/CREAT UR-RTO: 17 MG/G (ref 0–30)
MONOCYTES # BLD: 0.7 K/UL (ref 0–1)
MONOCYTES NFR BLD: 9 % (ref 5–13)
NEUTS SEG # BLD: 4.7 K/UL (ref 1.8–8)
NEUTS SEG NFR BLD: 59 % (ref 32–75)
NRBC # BLD: 0 K/UL (ref 0–0.01)
NRBC BLD-RTO: 0 PER 100 WBC
PLATELET # BLD AUTO: 241 K/UL (ref 150–400)
PMV BLD AUTO: 11 FL (ref 8.9–12.9)
POTASSIUM SERPL-SCNC: 3.7 MMOL/L (ref 3.5–5.1)
PROT SERPL-MCNC: 7.6 G/DL (ref 6.4–8.2)
RBC # BLD AUTO: 5 M/UL (ref 4.1–5.7)
SODIUM SERPL-SCNC: 138 MMOL/L (ref 136–145)
TRIGL SERPL-MCNC: 85 MG/DL (ref ?–150)
VLDLC SERPL CALC-MCNC: 17 MG/DL
WBC # BLD AUTO: 7.9 K/UL (ref 4.1–11.1)

## 2021-12-10 NOTE — PROGRESS NOTES
Called and spoke with patient and informed of labs results, per Dr Rubi Godoy Diabetes well controlled,  labs are normal including blood count, kidney function, liver function and cholesterol.

## 2022-03-19 PROBLEM — E66.01 OBESITY, MORBID (HCC): Status: ACTIVE | Noted: 2020-02-24

## 2022-03-19 PROBLEM — G47.33 OSA (OBSTRUCTIVE SLEEP APNEA): Status: ACTIVE | Noted: 2020-10-27

## 2023-05-22 RX ORDER — CITALOPRAM 20 MG/1
1 TABLET ORAL DAILY
COMMUNITY
Start: 2021-12-02

## 2023-05-22 RX ORDER — ALBUTEROL SULFATE 90 UG/1
2 AEROSOL, METERED RESPIRATORY (INHALATION) EVERY 4 HOURS PRN
COMMUNITY
Start: 2021-12-02

## 2023-05-22 RX ORDER — CARVEDILOL 3.12 MG/1
1 TABLET ORAL 2 TIMES DAILY WITH MEALS
COMMUNITY
Start: 2021-12-02

## 2023-05-22 RX ORDER — FUROSEMIDE 20 MG/1
20 TABLET ORAL 2 TIMES DAILY
COMMUNITY
Start: 2021-12-02

## 2023-05-22 RX ORDER — LISINOPRIL 5 MG/1
1 TABLET ORAL DAILY
COMMUNITY
Start: 2021-12-02

## 2023-05-22 RX ORDER — AMLODIPINE BESYLATE 5 MG/1
5 TABLET ORAL DAILY
COMMUNITY
Start: 2021-12-02

## 2024-02-29 NOTE — PROGRESS NOTES
0 I have printed AVS and reviewed it with patient today. Return in about 2 months (around 8/10/2021). Patient verbalized understanding. The patient is with the nutritionist for follow up.  Romy Siegel RN Pts wife calling she did see some lab results online but did not see where it was listed that he was fasting for his lab work and she said the tech did not ask Peter if he was fasting /  pt was fasting for 15 hrs  her concern was that they did not ask him at his appt   Please call her back to advise